# Patient Record
Sex: FEMALE | Race: BLACK OR AFRICAN AMERICAN | Employment: FULL TIME | URBAN - METROPOLITAN AREA
[De-identification: names, ages, dates, MRNs, and addresses within clinical notes are randomized per-mention and may not be internally consistent; named-entity substitution may affect disease eponyms.]

---

## 2019-05-12 ENCOUNTER — HOSPITAL ENCOUNTER (OUTPATIENT)
Age: 59
Setting detail: OBSERVATION
Discharge: HOME | End: 2019-05-13
Attending: EMERGENCY MEDICINE | Admitting: INTERNAL MEDICINE
Payer: COMMERCIAL

## 2019-05-12 ENCOUNTER — APPOINTMENT (OUTPATIENT)
Dept: GENERAL RADIOLOGY | Age: 59
End: 2019-05-12
Payer: COMMERCIAL

## 2019-05-12 DIAGNOSIS — R07.9 CHEST PAIN, UNSPECIFIED TYPE: Primary | ICD-10-CM

## 2019-05-12 LAB
ANION GAP SERPL CALCULATED.3IONS-SCNC: 15 MMOL/L (ref 3–16)
BASOPHILS ABSOLUTE: 0.1 K/UL (ref 0–0.2)
BASOPHILS RELATIVE PERCENT: 1.1 %
BUN BLDV-MCNC: 18 MG/DL (ref 7–20)
CALCIUM SERPL-MCNC: 9.1 MG/DL (ref 8.3–10.6)
CHLORIDE BLD-SCNC: 105 MMOL/L (ref 99–110)
CO2: 22 MMOL/L (ref 21–32)
CREAT SERPL-MCNC: 0.6 MG/DL (ref 0.6–1.1)
D DIMER: 201 NG/ML DDU (ref 0–229)
EOSINOPHILS ABSOLUTE: 0.1 K/UL (ref 0–0.6)
EOSINOPHILS RELATIVE PERCENT: 1.6 %
GFR AFRICAN AMERICAN: >60
GFR NON-AFRICAN AMERICAN: >60
GLUCOSE BLD-MCNC: 175 MG/DL (ref 70–99)
HCT VFR BLD CALC: 38.3 % (ref 36–48)
HEMOGLOBIN: 12.6 G/DL (ref 12–16)
LYMPHOCYTES ABSOLUTE: 3.1 K/UL (ref 1–5.1)
LYMPHOCYTES RELATIVE PERCENT: 39.1 %
MCH RBC QN AUTO: 27.3 PG (ref 26–34)
MCHC RBC AUTO-ENTMCNC: 32.8 G/DL (ref 31–36)
MCV RBC AUTO: 83.4 FL (ref 80–100)
MONOCYTES ABSOLUTE: 0.4 K/UL (ref 0–1.3)
MONOCYTES RELATIVE PERCENT: 5 %
NEUTROPHILS ABSOLUTE: 4.2 K/UL (ref 1.7–7.7)
NEUTROPHILS RELATIVE PERCENT: 53.2 %
PDW BLD-RTO: 14.2 % (ref 12.4–15.4)
PLATELET # BLD: 240 K/UL (ref 135–450)
PMV BLD AUTO: 9.8 FL (ref 5–10.5)
POTASSIUM REFLEX MAGNESIUM: 3.8 MMOL/L (ref 3.5–5.1)
PRO-BNP: 27 PG/ML (ref 0–124)
RBC # BLD: 4.59 M/UL (ref 4–5.2)
SODIUM BLD-SCNC: 142 MMOL/L (ref 136–145)
TROPONIN: <0.01 NG/ML
WBC # BLD: 8 K/UL (ref 4–11)

## 2019-05-12 PROCEDURE — 2580000003 HC RX 258: Performed by: INTERNAL MEDICINE

## 2019-05-12 PROCEDURE — 80048 BASIC METABOLIC PNL TOTAL CA: CPT

## 2019-05-12 PROCEDURE — 6370000000 HC RX 637 (ALT 250 FOR IP): Performed by: EMERGENCY MEDICINE

## 2019-05-12 PROCEDURE — 71046 X-RAY EXAM CHEST 2 VIEWS: CPT

## 2019-05-12 PROCEDURE — G0378 HOSPITAL OBSERVATION PER HR: HCPCS

## 2019-05-12 PROCEDURE — 99285 EMERGENCY DEPT VISIT HI MDM: CPT

## 2019-05-12 PROCEDURE — 96372 THER/PROPH/DIAG INJ SC/IM: CPT

## 2019-05-12 PROCEDURE — 84484 ASSAY OF TROPONIN QUANT: CPT

## 2019-05-12 PROCEDURE — 6370000000 HC RX 637 (ALT 250 FOR IP): Performed by: INTERNAL MEDICINE

## 2019-05-12 PROCEDURE — 6360000002 HC RX W HCPCS: Performed by: INTERNAL MEDICINE

## 2019-05-12 PROCEDURE — 36415 COLL VENOUS BLD VENIPUNCTURE: CPT

## 2019-05-12 PROCEDURE — 85379 FIBRIN DEGRADATION QUANT: CPT

## 2019-05-12 PROCEDURE — 85025 COMPLETE CBC W/AUTO DIFF WBC: CPT

## 2019-05-12 PROCEDURE — 93005 ELECTROCARDIOGRAM TRACING: CPT | Performed by: EMERGENCY MEDICINE

## 2019-05-12 PROCEDURE — 83880 ASSAY OF NATRIURETIC PEPTIDE: CPT

## 2019-05-12 RX ORDER — SODIUM CHLORIDE 0.9 % (FLUSH) 0.9 %
10 SYRINGE (ML) INJECTION EVERY 12 HOURS SCHEDULED
Status: DISCONTINUED | OUTPATIENT
Start: 2019-05-12 | End: 2019-05-13 | Stop reason: HOSPADM

## 2019-05-12 RX ORDER — FAMOTIDINE 20 MG/1
40 TABLET, FILM COATED ORAL NIGHTLY
Status: DISCONTINUED | OUTPATIENT
Start: 2019-05-12 | End: 2019-05-12 | Stop reason: CLARIF

## 2019-05-12 RX ORDER — LOSARTAN POTASSIUM 25 MG/1
25 TABLET ORAL DAILY
Status: DISCONTINUED | OUTPATIENT
Start: 2019-05-12 | End: 2019-05-13 | Stop reason: HOSPADM

## 2019-05-12 RX ORDER — LOSARTAN POTASSIUM 25 MG/1
25 TABLET ORAL DAILY
COMMUNITY
End: 2019-08-18

## 2019-05-12 RX ORDER — NITROGLYCERIN 0.4 MG/1
0.4 TABLET SUBLINGUAL EVERY 5 MIN PRN
Status: DISCONTINUED | OUTPATIENT
Start: 2019-05-12 | End: 2019-05-13 | Stop reason: HOSPADM

## 2019-05-12 RX ORDER — AMLODIPINE BESYLATE 5 MG/1
5 TABLET ORAL DAILY
COMMUNITY
End: 2019-08-18

## 2019-05-12 RX ORDER — ASPIRIN 81 MG/1
81 TABLET, CHEWABLE ORAL DAILY
Status: DISCONTINUED | OUTPATIENT
Start: 2019-05-13 | End: 2019-05-13 | Stop reason: HOSPADM

## 2019-05-12 RX ORDER — SODIUM CHLORIDE 0.9 % (FLUSH) 0.9 %
10 SYRINGE (ML) INJECTION PRN
Status: DISCONTINUED | OUTPATIENT
Start: 2019-05-12 | End: 2019-05-13 | Stop reason: HOSPADM

## 2019-05-12 RX ORDER — VALSARTAN AND HYDROCHLOROTHIAZIDE 80; 12.5 MG/1; MG/1
1 TABLET, FILM COATED ORAL DAILY
Status: DISCONTINUED | OUTPATIENT
Start: 2019-05-12 | End: 2019-05-12 | Stop reason: CLARIF

## 2019-05-12 RX ORDER — ASPIRIN 81 MG/1
324 TABLET, CHEWABLE ORAL ONCE
Status: COMPLETED | OUTPATIENT
Start: 2019-05-12 | End: 2019-05-12

## 2019-05-12 RX ORDER — OMEPRAZOLE 10 MG/1
10 CAPSULE, DELAYED RELEASE ORAL DAILY
COMMUNITY
End: 2019-08-23 | Stop reason: CLARIF

## 2019-05-12 RX ORDER — TOPIRAMATE 25 MG/1
25 TABLET ORAL 2 TIMES DAILY
Status: DISCONTINUED | OUTPATIENT
Start: 2019-05-12 | End: 2019-05-13 | Stop reason: HOSPADM

## 2019-05-12 RX ORDER — AMLODIPINE BESYLATE 5 MG/1
5 TABLET ORAL DAILY
Status: DISCONTINUED | OUTPATIENT
Start: 2019-05-12 | End: 2019-05-13 | Stop reason: HOSPADM

## 2019-05-12 RX ORDER — ONDANSETRON 2 MG/ML
4 INJECTION INTRAMUSCULAR; INTRAVENOUS EVERY 6 HOURS PRN
Status: DISCONTINUED | OUTPATIENT
Start: 2019-05-12 | End: 2019-05-13 | Stop reason: HOSPADM

## 2019-05-12 RX ORDER — ATORVASTATIN CALCIUM 40 MG/1
40 TABLET, FILM COATED ORAL NIGHTLY
Status: DISCONTINUED | OUTPATIENT
Start: 2019-05-12 | End: 2019-05-13 | Stop reason: HOSPADM

## 2019-05-12 RX ADMIN — AMLODIPINE BESYLATE 5 MG: 5 TABLET ORAL at 18:11

## 2019-05-12 RX ADMIN — ASPIRIN 81 MG 324 MG: 81 TABLET ORAL at 12:23

## 2019-05-12 RX ADMIN — Medication 10 ML: at 20:16

## 2019-05-12 RX ADMIN — ENOXAPARIN SODIUM 40 MG: 40 INJECTION SUBCUTANEOUS at 16:42

## 2019-05-12 RX ADMIN — TOPIRAMATE 25 MG: 25 TABLET, FILM COATED ORAL at 20:15

## 2019-05-12 RX ADMIN — ATORVASTATIN CALCIUM 40 MG: 40 TABLET, FILM COATED ORAL at 20:15

## 2019-05-12 RX ADMIN — LOSARTAN POTASSIUM 25 MG: 25 TABLET ORAL at 18:11

## 2019-05-12 ASSESSMENT — PAIN DESCRIPTION - LOCATION
LOCATION: CHEST

## 2019-05-12 ASSESSMENT — PAIN SCALES - GENERAL
PAINLEVEL_OUTOF10: 8
PAINLEVEL_OUTOF10: 5
PAINLEVEL_OUTOF10: 5
PAINLEVEL_OUTOF10: 0

## 2019-05-12 ASSESSMENT — PAIN DESCRIPTION - FREQUENCY
FREQUENCY: CONTINUOUS
FREQUENCY: CONTINUOUS

## 2019-05-12 ASSESSMENT — PAIN DESCRIPTION - PROGRESSION
CLINICAL_PROGRESSION: NOT CHANGED
CLINICAL_PROGRESSION: NOT CHANGED

## 2019-05-12 ASSESSMENT — PAIN DESCRIPTION - PAIN TYPE
TYPE: ACUTE PAIN

## 2019-05-12 ASSESSMENT — PAIN DESCRIPTION - ORIENTATION
ORIENTATION: LEFT;UPPER

## 2019-05-12 ASSESSMENT — PAIN DESCRIPTION - DESCRIPTORS
DESCRIPTORS: TIGHTNESS
DESCRIPTORS: CRUSHING;PRESSURE
DESCRIPTORS: TIGHTNESS

## 2019-05-12 ASSESSMENT — PAIN - FUNCTIONAL ASSESSMENT
PAIN_FUNCTIONAL_ASSESSMENT: ACTIVITIES ARE NOT PREVENTED
PAIN_FUNCTIONAL_ASSESSMENT: ACTIVITIES ARE NOT PREVENTED

## 2019-05-12 ASSESSMENT — HEART SCORE: ECG: 1

## 2019-05-12 ASSESSMENT — PAIN DESCRIPTION - ONSET
ONSET: ON-GOING
ONSET: ON-GOING

## 2019-05-12 NOTE — H&P
Hospital Medicine History & Physical      PCP: Bev Lara DO    Date of Admission: 5/12/2019    Date of Service: Pt seen/examined on 5/12/2019 and Placed in Observation. Chief Complaint:  Chest pain and tightness, shortness of breath      History Of Present Illness:      Arron Sanz is a 63 y.o. female who presented with acute onset of shortness of breath and chest pain yesterday while she was grocery shopping. She reports that she had done her laundry prior to that. Chest pain improved when she sat down to rest, but did not completely go away, this morning the chest pain had persisted. Therefore, she presented to the ED. She reports the pain is moderate to severe located centrally and on left side of her chest, is stabbing in nature and gets worse with deep breathing. Is not associated with activity or rest.      Old medical chart reviewed, briefly , patient had a negative stress test in 2015 when she presented with similar symptoms. Past Medical History:          Diagnosis Date    Back pain 11/15/2012    Essential hypertension, benign 7/5/2011    GERD (gastroesophageal reflux disease) 4/22/2016    Hyperlipidemia 7/7/2011    Migraine     Osteoarthritis of right knee 7/7/2011    Pyriformis syndrome 12/31/2012       Past Surgical History:          Procedure Laterality Date    BACK SURGERY      ECTOPIC PREGNANCY SURGERY      ENDOMETRIAL ABLATION      HYSTERECTOMY      JOINT REPLACEMENT         Medications Prior to Admission:      Prior to Admission medications    Medication Sig Start Date End Date Taking?  Authorizing Provider   losartan (COZAAR) 25 MG tablet Take 25 mg by mouth daily   Yes Historical Provider, MD   omeprazole (PRILOSEC) 10 MG delayed release capsule Take 10 mg by mouth daily   Yes Historical Provider, MD   Cyanocobalamin 1000 MCG CAPS Take 1,000 mcg by mouth daily 4/23/16  Yes Sylvia Cushing, MD   oxyCODONE-acetaminophen (PERCOCET) 5-325 MG per tablet Take 1-2 without Rales/Wheezes/Rhonchi. Cardiovascular:  Regular rate and rhythm with normal S1/S2 without murmurs, rubs or gallops. Tenderness to palpation at left subcostal region  Abdomen: Soft, non-tender, non-distended with normal bowel sounds. Musculoskeletal:  No clubbing, cyanosis or edema bilaterally. Full range of motion without deformity. Skin: Skin color, texture, turgor normal.  No rashes or lesions. Neurologic:  Neurovascularly intact without any focal sensory/motor deficits. Cranial nerves: II-XII intact, grossly non-focal.  Psychiatric:  Alert and oriented, thought content appropriate, normal insight  Capillary Refill: Brisk,< 3 seconds   Peripheral Pulses: +2 palpable, equal bilaterally       Labs:     Recent Labs     05/12/19  1224   WBC 8.0   HGB 12.6   HCT 38.3        Recent Labs     05/12/19  1224      K 3.8      CO2 22   BUN 18   CREATININE 0.6   CALCIUM 9.1     No results for input(s): AST, ALT, BILIDIR, BILITOT, ALKPHOS in the last 72 hours. No results for input(s): INR in the last 72 hours. Recent Labs     05/12/19  1224 05/12/19  1418   TROPONINI <0.01 <0.01       Urinalysis:      Lab Results   Component Value Date    BLOODU neg 02/26/2014    SPECGRAV 1.030 02/26/2014    GLUCOSEU neg 02/26/2014       Radiology:     CXR: I have reviewed the CXR with the following interpretation:     No acute infiltrate      EKG:  EKG reviewed with the ED physician, T-wave inversions in lateral leads.   Not available for review in EMR currently    XR CHEST STANDARD (2 VW)   Final Result      No acute cardiopulmonary disease      NM Cardiac Stress Test Nuclear Imaging    (Results Pending)   NM Cardiac Stress Test Nuclear Imaging    (Results Pending)       ASSESSMENT:    Active Hospital Problems    Diagnosis    Chest pain [R07.9]    GERD (gastroesophageal reflux disease) [K21.9]    Hyperlipidemia [E78.5]    Essential hypertension, benign [I10]         PLAN:    Central and left-sided chest pain with point tenderness point towards a musculoskeletal cause. However, patient has history of hypertension, hyperlipidemia, as well as strong family history. We will obtain exercise stress test to rule out cardiac cause. D-dimer is negative. Continue to trend troponins. If stress test negative, will consider GI versus musculoskeletal causes for chest pain      DVT Prophylaxis: Lovenox  Diet: DIET GENERAL; No Caffeine  Diet NPO, After Midnight  Diet NPO, After Midnight  Diet NPO, After Midnight  Code Status: Full Code    PT/OT Eval Status: Patient at baseline    Dispo - observation      Katherine Munson MD  5/12/2019 4:25 PM    Thank you Mandy Fernandez DO for the opportunity to be involved in this patient's care. If you have any questions or concerns please feel free to contact me at [phone].

## 2019-05-12 NOTE — PROGRESS NOTES
4 Eyes Admission Assessment     I agree as the admission nurse that 2 RN's have performed a thorough Head to Toe Skin Assessment on the patient. ALL assessment sites listed below have been assessed on admission. Areas assessed by both nurses:   [x]   Head, Face, and Ears   [x]   Shoulders, Back, and Chest  [x]   Arms, Elbows, and Hands   [x]   Coccyx, Sacrum, and Ischum  [x]   Legs, Feet, and Heels        Does the Patient have Skin Breakdown?   No         Jon Prevention initiated:  No   Wound Care Orders initiated:  No      [facility] nurse consulted for Pressure Injury (Stage 3,4, Unstageable, DTI, NWPT, and Complex wounds):  No      Nurse 1 eSignature: Electronically signed by Tyson Batista RN on 5/12/19 at 3:44 PM    **SHARE this note so that the co-signing nurse is able to place an eSignature**    Nurse 2 eSignature: Electronically signed by Cecilio Corona RN on 5/12/19 at 4:59 PM

## 2019-05-12 NOTE — ED TRIAGE NOTES
Pt arrived to ED with chest pain, nausea, SOB with exertion, LUQ pain that started yesterday while doing laundry. Today pain woke her up out of her sleep around 0830. Took one 81 mg aspirin this morning.

## 2019-05-12 NOTE — ED PROVIDER NOTES
[address] [facility] RESIDENT NOTE       Date of evaluation: 5/12/2019    Chief Complaint     Chest Pain      History of Present Illness     Deborah Prasad is a 63 y.o. female with history of hypertension, hyperlipidemia, migraines who presents with left-sided chest pressure. Pain started yesterday while doing some laundry. Pain felt like pressure in the left breast and under the left breast.  She had some tingling sensation in the left arm. No pain in the face or back. Had some mild shortness of breath with the pain. She feels like the pain is worse with movement. The pain was not pleuritic. She did have some nausea but no vomiting. Denies diaphoresis. Denies any swelling or pain in the lower extremities. Denies fever or productive cough or hemoptysis. Does have a family history of blood clots. Patient had a nuclear stress in April 2016 which revealed no evidence of ischemia and an EF of 55%. Review of Systems     Review of Systems   All other systems reviewed and are negative. Past Medical, Surgical, Family, and Social History     She has a past medical history of Back pain, Essential hypertension, benign, GERD (gastroesophageal reflux disease), Hyperlipidemia, Migraine, Osteoarthritis of right knee, and Pyriformis syndrome. She has a past surgical history that includes Hysterectomy; Endometrial ablation; joint replacement; Ectopic pregnancy surgery; and back surgery. Her family history includes Cancer in her maternal aunt, maternal grandmother, and paternal grandfather; [place] in her father; Diabetes in her mother; Heart Disease in her mother; Obesity in her mother. She reports that she quit smoking about 19 years ago. She quit after 13.00 years of use. She has never used smokeless tobacco. She reports that she drinks alcohol. She reports that she does not use drugs.     Medications     Previous Medications    CYANOCOBALAMIN 1000 MCG CAPS    Take 1,000 mcg by mouth daily    FAMOTIDINE (PEPCID) 40 MG TABLET    Take 1 tablet by mouth nightly    FERROUS SULFATE (FE TABS) 325 (65 FE) MG EC TABLET    Take 1 tablet by mouth daily (with breakfast)    METRONIDAZOLE (METROGEL) 0.75 % GEL    insert one applicatofull vaginally hs x5    OXYCODONE-ACETAMINOPHEN (PERCOCET) 5-325 MG PER TABLET    Take 1-2 tablets by mouth every 6 hours as needed for Pain  WARNING:  May cause drowsiness. May impair ability to operate vehicles or machinery. Do not use in combination with alcohol. Tamiko Andry PANTOPRAZOLE (PROTONIX) 40 MG TABLET    Take 1 tablet by mouth 2 times daily (before meals)    TOPIRAMATE (TOPAMAX) 25 MG TABLET    Take 25 mg by mouth 2 times daily    VALSARTAN-HYDROCHLOROTHIAZIDE (DIOVAN-HCT) 80-12.5 MG PER TABLET    TAKE 1 TABLET BY MOUTH DAILY       Allergies     She is allergic to penicillins and tramadol. Physical Exam     INITIAL VITALS: BP: (!) 139/97, Temp: 98.7 °F (37.1 °C), Pulse: 105, Resp: 18, SpO2: 96 %   Physical Exam   Constitutional: She is oriented to person, place, and time. She appears well-developed and well-nourished. No distress. HENT:   Head: Normocephalic and atraumatic. Eyes: Pupils are equal, round, and reactive to light. Neck: Normal range of motion. Cardiovascular: Normal rate, regular rhythm, normal heart sounds and intact distal pulses. No murmur heard. Pulmonary/Chest: Effort normal and breath sounds normal. No respiratory distress. She has no wheezes. She exhibits no tenderness. Abdominal: Soft. She exhibits no distension. There is no tenderness. Musculoskeletal: Normal range of motion. She exhibits no edema or tenderness. Neurological: She is alert and oriented to person, place, and time. No cranial nerve deficit or sensory deficit. Coordination normal.   Skin: Skin is warm and dry. Capillary refill takes 2 to 3 seconds. Psychiatric: She has a normal mood and affect.        DiagnosticResults     EKG Interpreted in conjunction with emergencydepartment physician No att. providers found  Rhythm: sinus tachycardia  Rate: 100-110  Axis: normal  Ectopy: none  Conduction: normal  ST Segments: no acute change  T Waves:inversion in  lateral leads  Q Waves: none  Clinical Impression: T-wave inversions in the lateral leads are new when compared to prior  Comparison:  April 2016    RADIOLOGY:  XR CHEST STANDARD (2 VW)   Final Result      No acute cardiopulmonary disease          LABS:   Results for orders placed or performed during the hospital encounter of 05/12/19   CBC Auto Differential   Result Value Ref Range    WBC 8.0 4.0 - 11.0 K/uL    RBC 4.59 4.00 - 5.20 M/uL    Hemoglobin 12.6 12.0 - 16.0 g/dL    Hematocrit 38.3 36.0 - 48.0 %    MCV 83.4 80.0 - 100.0 fL    MCH 27.3 26.0 - 34.0 pg    MCHC 32.8 31.0 - 36.0 g/dL    RDW 14.2 12.4 - 15.4 %    Platelets 685 397 - 171 K/uL    MPV 9.8 5.0 - 10.5 fL    Neutrophils % 53.2 %    Lymphocytes % 39.1 %    Monocytes % 5.0 %    Eosinophils % 1.6 %    Basophils % 1.1 %    Neutrophils # 4.2 1.7 - 7.7 K/uL    Lymphocytes # 3.1 1.0 - 5.1 K/uL    Monocytes # 0.4 0.0 - 1.3 K/uL    Eosinophils # 0.1 0.0 - 0.6 K/uL    Basophils # 0.1 0.0 - 0.2 K/uL   Basic Metabolic Panel w/ Reflex to MG   Result Value Ref Range    Sodium 142 136 - 145 mmol/L    Potassium reflex Magnesium 3.8 3.5 - 5.1 mmol/L    Chloride 105 99 - 110 mmol/L    CO2 22 21 - 32 mmol/L    Anion Gap 15 3 - 16    Glucose 175 (H) 70 - 99 mg/dL    BUN 18 7 - 20 mg/dL    CREATININE 0.6 0.6 - 1.1 mg/dL    GFR Non-African American >60 >60    GFR African American >60 >60    Calcium 9.1 8.3 - 10.6 mg/dL   Brain Natriuretic Peptide   Result Value Ref Range    Pro-BNP 27 0 - 124 pg/mL   Troponin   Result Value Ref Range    Troponin <0.01 <0.01 ng/mL   D-dimer, quantitative (Lab)   Result Value Ref Range    D-Dimer, Quant 201 0 - 229 ng/mL DDU   Troponin   Result Value Ref Range    Troponin <0.01 <0.01 ng/mL       ED BEDSIDE ULTRASOUND:    RECENT VITALS:  BP: (!) 124/102, Temp: 98.7 °F (37.1 °C), Pulse: 84,Resp: 15, SpO2: 96 %     Procedures       ED Course     Nursing Notes, Past Medical Hx, Past Surgical Hx, Social Hx, Allergies, and Family Hx were reviewed. The patient was given the followingmedications:  Orders Placed This Encounter   Medications    aspirin chewable tablet 324 mg       CONSULTS:  Tamy Rincon / UMBERTO / Christiano Redd is a 63 y.o. female presenting with left-sided chest pain with some paresthesias in the left arm. She had some shortness of breath and nausea with this as well. She is hemodynamically stable with some mild tachycardia. Her lungs are clear. EKG with some new T-wave inversions in the lateral leads but no ST elevation. Troponin was negative. X-ray without evidence of pneumonia or pulmonary edema or cardiomegaly. We did obtain a d-dimer due to her tachycardia and age and this was negative. Her heart rate normalized without intervention. Do not think she needs CT PA for PE workup. Low suspicion for aortic dissection. Heart score is 5. Patient will benefit from repeat stress imaging. We'll admit her for trending of troponins and stress in the morning. Patient agreeable to plan. This patient was also evaluated by the attending physician. All care plans werediscussed and agreed upon. Clinical Impression     1.  Chest pain, unspecified type        Disposition     PATIENT REFERRED TO:  Demetrice Domínguez, [address] [facility]  [address] [place] [address]  [phone]            DISCHARGE MEDICATIONS:  New Prescriptions    No medications on file       DISPOSITION Admitted 05/12/2019 02:37:35 PM        Francis Almaguer MD  Resident  05/12/19 3224

## 2019-05-12 NOTE — ED PROVIDER NOTES
ED Attending Attestation Note     Date of evaluation: 5/12/2019    This patient was seen by the resident. I have seen and examined the patient, agree with the workup, evaluation, management and diagnosis. The care plan has been discussed. I have reviewed the ECG and concur with the resident's interpretation. My assessment reveals a 52-year-old female who presents with chief complaint of chest pain. Patient with exertional chest pain and shortness of breath for 2 days. Normal heart and lungs on exam, no pain at this time.        Alfredo Lin MD  05/12/19 2012

## 2019-05-12 NOTE — PROGRESS NOTES
Pt is admitted to unit from ED. VSS. Bed in lowest position, call light and belongings within reach. Patient education folder given and reviewed. Safety protocols and unit activities (VS, meds, rounding, etc.) explained to patient/family. White board updated. No further questions or needs stated at this time. Instructed to call with any needs.   Electronically signed by Richie Couch RN on 5/12/2019 at 3:44 PM

## 2019-05-13 VITALS
DIASTOLIC BLOOD PRESSURE: 87 MMHG | BODY MASS INDEX: 36.93 KG/M2 | HEIGHT: 64 IN | TEMPERATURE: 97.9 F | WEIGHT: 216.3 LBS | SYSTOLIC BLOOD PRESSURE: 155 MMHG | RESPIRATION RATE: 18 BRPM | OXYGEN SATURATION: 99 % | HEART RATE: 69 BPM

## 2019-05-13 LAB
EKG ATRIAL RATE: 104 BPM
EKG ATRIAL RATE: 76 BPM
EKG DIAGNOSIS: NORMAL
EKG DIAGNOSIS: NORMAL
EKG P AXIS: 38 DEGREES
EKG P AXIS: 56 DEGREES
EKG P-R INTERVAL: 172 MS
EKG P-R INTERVAL: 176 MS
EKG Q-T INTERVAL: 348 MS
EKG Q-T INTERVAL: 412 MS
EKG QRS DURATION: 90 MS
EKG QRS DURATION: 90 MS
EKG QTC CALCULATION (BAZETT): 455 MS
EKG QTC CALCULATION (BAZETT): 463 MS
EKG R AXIS: -6 DEGREES
EKG R AXIS: 1 DEGREES
EKG T AXIS: -79 DEGREES
EKG T AXIS: 208 DEGREES
EKG VENTRICULAR RATE: 103 BPM
EKG VENTRICULAR RATE: 76 BPM
HCT VFR BLD CALC: 37.9 % (ref 36–48)
HEMOGLOBIN: 12.1 G/DL (ref 12–16)
LV EF: 56 %
LVEF MODALITY: NORMAL
MCH RBC QN AUTO: 27.2 PG (ref 26–34)
MCHC RBC AUTO-ENTMCNC: 32 G/DL (ref 31–36)
MCV RBC AUTO: 84.9 FL (ref 80–100)
PDW BLD-RTO: 14.1 % (ref 12.4–15.4)
PLATELET # BLD: 209 K/UL (ref 135–450)
PMV BLD AUTO: 9.4 FL (ref 5–10.5)
RBC # BLD: 4.47 M/UL (ref 4–5.2)
WBC # BLD: 7.7 K/UL (ref 4–11)

## 2019-05-13 PROCEDURE — 3430000000 HC RX DIAGNOSTIC RADIOPHARMACEUTICAL: Performed by: INTERNAL MEDICINE

## 2019-05-13 PROCEDURE — 36415 COLL VENOUS BLD VENIPUNCTURE: CPT

## 2019-05-13 PROCEDURE — 6370000000 HC RX 637 (ALT 250 FOR IP): Performed by: INTERNAL MEDICINE

## 2019-05-13 PROCEDURE — 93017 CV STRESS TEST TRACING ONLY: CPT

## 2019-05-13 PROCEDURE — 85027 COMPLETE CBC AUTOMATED: CPT

## 2019-05-13 PROCEDURE — 93005 ELECTROCARDIOGRAM TRACING: CPT | Performed by: INTERNAL MEDICINE

## 2019-05-13 PROCEDURE — A9502 TC99M TETROFOSMIN: HCPCS | Performed by: INTERNAL MEDICINE

## 2019-05-13 PROCEDURE — 6360000002 HC RX W HCPCS: Performed by: INTERNAL MEDICINE

## 2019-05-13 PROCEDURE — 93010 ELECTROCARDIOGRAM REPORT: CPT | Performed by: INTERNAL MEDICINE

## 2019-05-13 PROCEDURE — G0378 HOSPITAL OBSERVATION PER HR: HCPCS

## 2019-05-13 PROCEDURE — 93306 TTE W/DOPPLER COMPLETE: CPT

## 2019-05-13 PROCEDURE — 96372 THER/PROPH/DIAG INJ SC/IM: CPT

## 2019-05-13 PROCEDURE — 96374 THER/PROPH/DIAG INJ IV PUSH: CPT

## 2019-05-13 PROCEDURE — 6370000000 HC RX 637 (ALT 250 FOR IP): Performed by: FAMILY MEDICINE

## 2019-05-13 PROCEDURE — 2580000003 HC RX 258: Performed by: INTERNAL MEDICINE

## 2019-05-13 PROCEDURE — 78452 HT MUSCLE IMAGE SPECT MULT: CPT

## 2019-05-13 RX ORDER — ASPIRIN 81 MG/1
81 TABLET, CHEWABLE ORAL DAILY
Qty: 30 TABLET | Refills: 3 | Status: SHIPPED | OUTPATIENT
Start: 2019-05-14 | End: 2022-09-06

## 2019-05-13 RX ORDER — ACETAMINOPHEN 325 MG/1
650 TABLET ORAL EVERY 4 HOURS PRN
Status: DISCONTINUED | OUTPATIENT
Start: 2019-05-13 | End: 2019-05-13 | Stop reason: HOSPADM

## 2019-05-13 RX ORDER — KETOROLAC TROMETHAMINE 15 MG/ML
15 INJECTION, SOLUTION INTRAMUSCULAR; INTRAVENOUS EVERY 6 HOURS PRN
Status: DISCONTINUED | OUTPATIENT
Start: 2019-05-13 | End: 2019-05-13 | Stop reason: HOSPADM

## 2019-05-13 RX ADMIN — ACETAMINOPHEN 650 MG: 325 TABLET ORAL at 07:04

## 2019-05-13 RX ADMIN — REGADENOSON 0.4 MG: 0.08 INJECTION, SOLUTION INTRAVENOUS at 14:29

## 2019-05-13 RX ADMIN — TETROFOSMIN 30 MILLICURIE: 1.38 INJECTION, POWDER, LYOPHILIZED, FOR SOLUTION INTRAVENOUS at 14:29

## 2019-05-13 RX ADMIN — AMLODIPINE BESYLATE 5 MG: 5 TABLET ORAL at 09:21

## 2019-05-13 RX ADMIN — ENOXAPARIN SODIUM 40 MG: 40 INJECTION SUBCUTANEOUS at 09:20

## 2019-05-13 RX ADMIN — ONDANSETRON 4 MG: 2 INJECTION INTRAMUSCULAR; INTRAVENOUS at 07:06

## 2019-05-13 RX ADMIN — TOPIRAMATE 25 MG: 25 TABLET, FILM COATED ORAL at 09:20

## 2019-05-13 RX ADMIN — LOSARTAN POTASSIUM 25 MG: 25 TABLET ORAL at 16:56

## 2019-05-13 RX ADMIN — ASPIRIN 81 MG 81 MG: 81 TABLET ORAL at 09:20

## 2019-05-13 RX ADMIN — KETOROLAC TROMETHAMINE 15 MG: 15 INJECTION INTRAMUSCULAR; INTRAVENOUS at 17:04

## 2019-05-13 RX ADMIN — TETROFOSMIN 10 MILLICURIE: 1.38 INJECTION, POWDER, LYOPHILIZED, FOR SOLUTION INTRAVENOUS at 12:48

## 2019-05-13 RX ADMIN — Medication 10 ML: at 12:48

## 2019-05-13 RX ADMIN — Medication 10 ML: at 14:29

## 2019-05-13 RX ADMIN — Medication 10 ML: at 09:07

## 2019-05-13 ASSESSMENT — PAIN DESCRIPTION - LOCATION
LOCATION: HEAD
LOCATION: HEAD

## 2019-05-13 ASSESSMENT — PAIN DESCRIPTION - FREQUENCY
FREQUENCY: CONTINUOUS
FREQUENCY: CONTINUOUS

## 2019-05-13 ASSESSMENT — PAIN SCALES - GENERAL
PAINLEVEL_OUTOF10: 4
PAINLEVEL_OUTOF10: 0
PAINLEVEL_OUTOF10: 8
PAINLEVEL_OUTOF10: 6
PAINLEVEL_OUTOF10: 0
PAINLEVEL_OUTOF10: 0
PAINLEVEL_OUTOF10: 1
PAINLEVEL_OUTOF10: 0
PAINLEVEL_OUTOF10: 8
PAINLEVEL_OUTOF10: 0

## 2019-05-13 ASSESSMENT — PAIN DESCRIPTION - PROGRESSION
CLINICAL_PROGRESSION: GRADUALLY WORSENING
CLINICAL_PROGRESSION: NOT CHANGED
CLINICAL_PROGRESSION: RAPIDLY IMPROVING
CLINICAL_PROGRESSION: RAPIDLY IMPROVING

## 2019-05-13 ASSESSMENT — PAIN DESCRIPTION - DESCRIPTORS
DESCRIPTORS: HEADACHE
DESCRIPTORS: HEADACHE

## 2019-05-13 ASSESSMENT — PAIN DESCRIPTION - ONSET
ONSET: ON-GOING
ONSET: ON-GOING

## 2019-05-13 ASSESSMENT — PAIN DESCRIPTION - PAIN TYPE
TYPE: ACUTE PAIN
TYPE: CHRONIC PAIN

## 2019-05-13 ASSESSMENT — PAIN DESCRIPTION - ORIENTATION: ORIENTATION: MID

## 2019-05-13 NOTE — PROGRESS NOTES
ok'd by pharmacist to d/c home after taking torodol iv, pt without any sedating effects h/a is improving

## 2019-05-13 NOTE — PLAN OF CARE
Problem: OXYGENATION/RESPIRATORY FUNCTION  Goal: Patient will maintain patent airway  Outcome: Met This Shift  Goal: Patient will achieve/maintain normal respiratory rate/effort  Description  Respiratory rate and effort will be within normal limits for the patient  Outcome: Met This Shift  Note:   On room air sats WNL's lungs decreased at bases, denies sob     Problem: HEMODYNAMIC STATUS  Goal: Patient has stable vital signs and fluid balance  Outcome: Met This Shift  Note:   See I/O vs cont to monitor     Problem: FLUID AND ELECTROLYTE IMBALANCE  Goal: Fluid and electrolyte balance are achieved/maintained  Outcome: Met This Shift  Note:   Cont. To monitor CE neg.      Problem: Pain:  Goal: Pain level will decrease  Description  Pain level will decrease  Outcome: Met This Shift  Goal: Control of acute pain  Description  Control of acute pain  Outcome: Met This Shift  Note:   Chest pressure when taking a deep breath comes and goes at time not severe  Goal: Control of chronic pain  Description  Control of chronic pain  Outcome: Met This Shift

## 2019-05-13 NOTE — PROGRESS NOTES
D/c instructions reviewed with pt pt states she is already on 81mg asa not new to her; she is aware of meds to stop taking, per teach back method

## 2019-05-15 NOTE — DISCHARGE SUMMARY
Hospital Medicine Discharge Summary    Patient ID: Swati Bhatti      Patient's PCP: Slava Gutierrez DO    Admit Date: 5/12/2019     Discharge Date: 5/13/2019    Admitting Physician: Rufus Mann MD     Discharge Physician: Rufus Mann MD     Discharge Diagnoses: Active Problems:    Essential hypertension, benign    Hyperlipidemia    Chest pain    GERD (gastroesophageal reflux disease)  Resolved Problems:    * No resolved hospital problems. *        The patient was seen and examined on day of discharge and this discharge summary is in conjunction with any daily progress note from day of discharge. Hospital Course:     Swati Bhatti is a 63 y.o. female who was admitted for chest pain, initial EKG and troponin were negative. She underwent cardiac stress test which was unremarkable. She was discharged home in stable condition. Her symptoms were attributed to acid reflux/GERD          Exam:     BP (!) 155/87   Pulse 69   Temp 97.9 °F (36.6 °C) (Oral)   Resp 18   Ht 5' 4\" (1.626 m)   Wt 216 lb 4.8 oz (98.1 kg)   SpO2 99%   BMI 37.13 kg/m²       General appearance:  No apparent distress, appears stated age and cooperative. HEENT:  Normal cephalic, atraumatic without obvious deformity. Pupils equal, round, and reactive to light. Extra ocular muscles intact. Conjunctivae/corneas clear. Neck: Supple, with full range of motion. No jugular venous distention. Trachea midline. Respiratory:  Normal respiratory effort. Clear to auscultation, bilaterally without Rales/Wheezes/Rhonchi. Cardiovascular:  Regular rate and rhythm with normal S1/S2 without murmurs, rubs or gallops. Abdomen: Soft, non-tender, non-distended with normal bowel sounds. Musculoskeletal:  No clubbing, cyanosis or edema bilaterally. Full range of motion without deformity. Skin: Skin color, texture, turgor normal.  No rashes or lesions. Neurologic:  Neurovascularly intact without any focal sensory/motor deficits. Cranial nerves: II-XII intact, grossly non-focal.  Psychiatric:  Alert and oriented, thought content appropriate, normal insight  Capillary Refill: Brisk,< 3 seconds   Peripheral Pulses: +2 palpable, equal bilaterally       Labs: For convenience and continuity at follow-up the following most recent labs are provided:      CBC:    Lab Results   Component Value Date    WBC 7.7 05/13/2019    HGB 12.1 05/13/2019    HCT 37.9 05/13/2019     05/13/2019       Renal:    Lab Results   Component Value Date     05/12/2019    K 3.8 05/12/2019     05/12/2019    CO2 22 05/12/2019    BUN 18 05/12/2019    CREATININE 0.6 05/12/2019    CALCIUM 9.1 05/12/2019         Significant Diagnostic Studies    Radiology:   NM Cardiac Stress Test Nuclear Imaging   Final Result      XR CHEST STANDARD (2 VW)   Final Result      No acute cardiopulmonary disease             Consults:     IP CONSULT TO HOSPITALIST  IP CONSULT TO CASE MANAGEMENT    Disposition:  home     Condition at Discharge: Stable    Discharge Instructions/Follow-up:      Aris Son, [facility] [address]  [phone]    In 1 week  patient will make appointment      Code Status:  Prior     Activity: activity as tolerated    Diet: regular diet      Discharge Medications:     Discharge Medication List as of 5/13/2019  6:16 PM           Details   aspirin 81 MG chewable tablet Take 1 tablet by mouth daily, Disp-30 tablet, R-3Normal              Details   losartan (COZAAR) 25 MG tablet Take 25 mg by mouth dailyHistorical Med      omeprazole (PRILOSEC) 10 MG delayed release capsule Take 10 mg by mouth dailyHistorical Med      amLODIPine (NORVASC) 5 MG tablet Take 5 mg by mouth dailyHistorical Med      Cyanocobalamin 1000 MCG CAPS Take 1,000 mcg by mouth daily, Disp-30 capsule, R-3      oxyCODONE-acetaminophen (PERCOCET) 5-325 MG per tablet Take 1-2 tablets by mouth every 6 hours as needed for Pain  WARNING:  May cause drowsiness. May impair ability to operate vehicles or machinery. Do not use in combination with alcohol. ., Disp-10 tablet, R-0      topiramate (TOPAMAX) 25 MG tablet Take 25 mg by mouth 2 times daily             Time Spent on discharge is more than 20 minutes in the examination, evaluation, counseling and review of medications and discharge plan. Signed:    Veronica Ashley MD   5/15/2019      Thank you Melvin Powers DO for the opportunity to be involved in this patient's care. If you have any questions or concerns please feel free to contact me at [phone].

## 2019-06-10 ENCOUNTER — OFFICE VISIT (OUTPATIENT)
Dept: INTERNAL MEDICINE CLINIC | Age: 59
End: 2019-06-10
Payer: COMMERCIAL

## 2019-06-10 VITALS
HEART RATE: 68 BPM | BODY MASS INDEX: 37.56 KG/M2 | WEIGHT: 220 LBS | RESPIRATION RATE: 12 BRPM | SYSTOLIC BLOOD PRESSURE: 128 MMHG | HEIGHT: 64 IN | DIASTOLIC BLOOD PRESSURE: 80 MMHG

## 2019-06-10 DIAGNOSIS — Z12.39 SCREENING FOR BREAST CANCER: ICD-10-CM

## 2019-06-10 DIAGNOSIS — Z00.00 PERIODIC HEALTH ASSESSMENT, GENERAL SCREENING, ADULT: ICD-10-CM

## 2019-06-10 DIAGNOSIS — Z12.11 SPECIAL SCREENING FOR MALIGNANT NEOPLASMS, COLON: ICD-10-CM

## 2019-06-10 DIAGNOSIS — Z00.00 ROUTINE GENERAL MEDICAL EXAMINATION AT A HEALTH CARE FACILITY: Primary | ICD-10-CM

## 2019-06-10 LAB
BILIRUBIN, POC: NORMAL
BLOOD URINE, POC: NORMAL
CLARITY, POC: CLEAR
COLOR, POC: YELLOW
GLUCOSE URINE, POC: NORMAL
KETONES, POC: NORMAL
LEUKOCYTE EST, POC: NORMAL
NITRITE, POC: NORMAL
PH, POC: 5
PROTEIN, POC: NORMAL
SPECIFIC GRAVITY, POC: 1.02
UROBILINOGEN, POC: NORMAL

## 2019-06-10 PROCEDURE — 99386 PREV VISIT NEW AGE 40-64: CPT | Performed by: INTERNAL MEDICINE

## 2019-06-10 PROCEDURE — 81002 URINALYSIS NONAUTO W/O SCOPE: CPT | Performed by: INTERNAL MEDICINE

## 2019-06-10 RX ORDER — GABAPENTIN 100 MG/1
100 CAPSULE ORAL PRN
COMMUNITY
End: 2019-12-11 | Stop reason: SDUPTHER

## 2019-06-10 ASSESSMENT — PATIENT HEALTH QUESTIONNAIRE - PHQ9
SUM OF ALL RESPONSES TO PHQ QUESTIONS 1-9: 0
2. FEELING DOWN, DEPRESSED OR HOPELESS: 0
SUM OF ALL RESPONSES TO PHQ QUESTIONS 1-9: 0
1. LITTLE INTEREST OR PLEASURE IN DOING THINGS: 0
SUM OF ALL RESPONSES TO PHQ9 QUESTIONS 1 & 2: 0

## 2019-06-10 NOTE — PROGRESS NOTES
Deborah CORLEY [place] 63 y.o. presents today with   Chief Complaint   Patient presents with    Annual Exam       Family History   Problem Relation Age of Onset    Cancer Maternal Aunt     Cancer Maternal Grandmother     Cancer Paternal Grandfather     Other Father          - cause unknown     Diabetes Mother 80        Alive - DM, Hypertension, heart disease     Rheum Arthritis Neg Hx     Osteoarthritis Neg Hx     Asthma Neg Hx     Breast Cancer Neg Hx     Heart Failure Neg Hx     High Cholesterol Neg Hx     Hypertension Neg Hx     Migraines Neg Hx     Ovarian Cancer Neg Hx     Rashes/Skin Problems Neg Hx     Seizures Neg Hx     Stroke Neg Hx     Thyroid Disease Neg Hx        Social History     Socioeconomic History    Marital status:      Spouse name: Not on file    Number of children: 3    Years of education: Not on file    Highest education level: Not on file   Occupational History    Occupation: accounts receivable      Comment: [employer]    Social Needs    Financial resource strain: Not on file    Food insecurity:     Worry: Not on file     Inability: Not on file    Transportation needs:     Medical: Not on file     Non-medical: Not on file   Tobacco Use    Smoking status: Former Smoker     Packs/day: 0.50     Years: 13.00     Pack years: 6.50     Last attempt to quit: 2000     Years since quittin.4    Smokeless tobacco: Never Used   Substance and Sexual Activity    Alcohol use:  Yes     Alcohol/week: 0.0 oz     Comment: occas    Drug use: No    Sexual activity: Yes     Partners: Male   Lifestyle    Physical activity:     Days per week: Not on file     Minutes per session: Not on file    Stress: Not on file   Relationships    Social connections:     Talks on phone: Not on file     Gets together: Not on file     Attends Uats[employer] service: Not on file     Active member of club or organization: Not on file     Attends meetings of clubs or organizations: Not on file     Relationship status: Not on file    Intimate partner violence:     Fear of current or ex partner: Not on file     Emotionally abused: Not on file     Physically abused: Not on file     Forced sexual activity: Not on file   Other Topics Concern    Not on file   Social History Narrative    Living Will:  No       Patient Active Problem List   Diagnosis    Essential hypertension, benign    Hyperlipidemia       Past Medical History:   Diagnosis Date    Essential hypertension, benign 7/5/2011    GERD (gastroesophageal reflux disease) 4/22/2016    Hyperlipidemia 7/7/2011    Migraine     Osteoarthritis of right knee 7/7/2011    Pyriformis syndrome 12/31/2012        Past Surgical History:   Procedure Laterality Date    ECTOPIC PREGNANCY SURGERY      ENDOMETRIAL ABLATION      HYSTERECTOMY      JOINT REPLACEMENT      LUMBAR FUSION  2014    Dr. Lamberto Gerber        Current Outpatient Medications   Medication Sig Dispense Refill    gabapentin (NEURONTIN) 100 MG capsule Take 100 mg by mouth as needed.  aspirin 81 MG chewable tablet Take 1 tablet by mouth daily 30 tablet 3    omeprazole (PRILOSEC) 10 MG delayed release capsule Take 10 mg by mouth daily      oxyCODONE-acetaminophen (PERCOCET) 5-325 MG per tablet Take 1-2 tablets by mouth every 6 hours as needed for Pain  WARNING:  May cause drowsiness. May impair ability to operate vehicles or machinery. Do not use in combination with alcohol. . 10 tablet 0    Cyanocobalamin 1000 MCG CAPS Take 1,000 mcg by mouth daily 30 capsule 3    losartan (COZAAR) 25 MG tablet Take 25 mg by mouth daily      amLODIPine (NORVASC) 5 MG tablet Take 5 mg by mouth daily       No current facility-administered medications for this visit.         Allergies   Allergen Reactions    Penicillins Shortness Of Breath and Swelling    Tramadol Other (See Comments)     Chest pain       Review of Systems:     Immunization History   Administered Date(s) Administered    Influenza Virus Vaccine 10/01/2018    Tdap (Boostrix, Adacel) 09/01/2018     Eye Exam:  July, 2018 at [facility]  Chest: Denies: cough, hemoptysis, shortness of breath, pleuritic chest pain, wheezing  Cardiovascular: Denies: dyspnea on exertion, orthopnea, paroxysmal nocturnal dyspnea, edema, palpitations  Abdomen: no abdominal pain, change in bowel habits, or black or bloody stools  Colonoscopy:  May 13, 2016 by Dr. Dago Ellis was normal. To be repeated 2026   Fall Risk low  ADL without assistance   Mammography: October 5, 2017   DEXA: N/A  Pelvic and PAP: May, 2016 by Dr. Az Heredia  Other:  She has had chest tightness on and off for four years. It does not occur with exertion but only with rest.  It is associated with shortness of breath without nausea, vomiting, or diaphoresis. She was hospitalized at [facility] 10 days ago at which time a stress test and echocardiogram were normal.  She also has migraine that is treated with acupuncture. Physical Exam:  General appearance: alert, appears stated age and cooperative  /81 (Site: Left Upper Arm, Position: Sitting, Cuff Size: Medium Adult)   Pulse 68   Resp 12   Ht 5' 4\" (1.626 m)   Wt 220 lb (99.8 kg)   BMI 37.76 kg/m²   Eyes: conjunctivae/corneas clear. PERRL, EOM's intact. Fundi benign. Ears: normal TM's and external ear canals both ears  Nose: Nares normal. Septum midline. Mucosa normal. No drainage or sinus tenderness.   Throat: no abnormalities  Neck: no adenopathy, no carotid bruit, no JVD, supple, symmetrical, trachea midline and thyroid not enlarged, symmetric, no tenderness/mass/nodules  Nodes:no adenopathy palpable  Breasts:Breasts symmetrical, skin without lesion(s), no nipple retraction or dimpling, no nipple discharge, no masses palpated, no axillary or supraclavicular adenopathy  Lungs: clear to auscultation bilaterally  Heart: regular rate and rhythm, S1, S2 normal, no murmur, click, rub or gallop  Abdomen: soft, non-tender; bowel sounds normal; no masses,  no

## 2019-06-11 ENCOUNTER — HOSPITAL ENCOUNTER (OUTPATIENT)
Dept: MAMMOGRAPHY | Age: 59
Discharge: HOME | End: 2019-06-11
Payer: COMMERCIAL

## 2019-06-11 DIAGNOSIS — Z12.39 SCREENING FOR BREAST CANCER: ICD-10-CM

## 2019-06-11 PROCEDURE — 77063 BREAST TOMOSYNTHESIS BI: CPT

## 2019-06-12 DIAGNOSIS — Z00.00 PERIODIC HEALTH ASSESSMENT, GENERAL SCREENING, ADULT: ICD-10-CM

## 2019-06-12 DIAGNOSIS — Z00.00 ROUTINE GENERAL MEDICAL EXAMINATION AT A HEALTH CARE FACILITY: ICD-10-CM

## 2019-06-12 LAB
A/G RATIO: 1.3 (ref 1.1–2.2)
ALBUMIN SERPL-MCNC: 4.1 G/DL (ref 3.4–5)
ALP BLD-CCNC: 47 U/L (ref 40–129)
ALT SERPL-CCNC: 21 U/L (ref 10–40)
ANION GAP SERPL CALCULATED.3IONS-SCNC: 14 MMOL/L (ref 3–16)
AST SERPL-CCNC: 17 U/L (ref 15–37)
BILIRUB SERPL-MCNC: <0.2 MG/DL (ref 0–1)
BUN BLDV-MCNC: 22 MG/DL (ref 7–20)
CALCIUM SERPL-MCNC: 9.4 MG/DL (ref 8.3–10.6)
CHLORIDE BLD-SCNC: 104 MMOL/L (ref 99–110)
CHOLESTEROL, TOTAL: 209 MG/DL (ref 0–199)
CO2: 21 MMOL/L (ref 21–32)
CREAT SERPL-MCNC: 0.7 MG/DL (ref 0.6–1.1)
ESTIMATED AVERAGE GLUCOSE: 128.4 MG/DL
GFR AFRICAN AMERICAN: >60
GFR NON-AFRICAN AMERICAN: >60
GLOBULIN: 3.1 G/DL
GLUCOSE BLD-MCNC: 130 MG/DL (ref 70–99)
HBA1C MFR BLD: 6.1 %
HDLC SERPL-MCNC: 44 MG/DL (ref 40–60)
HEPATITIS C ANTIBODY INTERPRETATION: NORMAL
LDL CHOLESTEROL CALCULATED: 138 MG/DL
POTASSIUM SERPL-SCNC: 4.2 MMOL/L (ref 3.5–5.1)
SODIUM BLD-SCNC: 139 MMOL/L (ref 136–145)
TOTAL PROTEIN: 7.2 G/DL (ref 6.4–8.2)
TRIGL SERPL-MCNC: 137 MG/DL (ref 0–150)
TSH SERPL DL<=0.05 MIU/L-ACNC: 1.79 UIU/ML (ref 0.27–4.2)
VLDLC SERPL CALC-MCNC: 27 MG/DL

## 2019-06-13 LAB
HIV AG/AB: NORMAL
HIV ANTIGEN: NORMAL
HIV-1 ANTIBODY: NORMAL
HIV-2 AB: NORMAL

## 2019-06-27 DIAGNOSIS — Z12.11 SPECIAL SCREENING FOR MALIGNANT NEOPLASMS, COLON: ICD-10-CM

## 2019-06-27 LAB
CONTROL: NORMAL
HEMOCCULT STL QL: NEGATIVE

## 2019-06-27 PROCEDURE — 82274 ASSAY TEST FOR BLOOD FECAL: CPT | Performed by: INTERNAL MEDICINE

## 2019-07-01 ENCOUNTER — TELEPHONE (OUTPATIENT)
Dept: INTERNAL MEDICINE CLINIC | Age: 59
End: 2019-07-01

## 2019-07-16 ENCOUNTER — TELEPHONE (OUTPATIENT)
Dept: INTERNAL MEDICINE CLINIC | Age: 59
End: 2019-07-16

## 2019-08-18 ENCOUNTER — HOSPITAL ENCOUNTER (EMERGENCY)
Age: 59
Discharge: HOME | End: 2019-08-18
Attending: EMERGENCY MEDICINE
Payer: COMMERCIAL

## 2019-08-18 VITALS
HEART RATE: 91 BPM | HEIGHT: 64 IN | WEIGHT: 210 LBS | TEMPERATURE: 98.4 F | OXYGEN SATURATION: 98 % | SYSTOLIC BLOOD PRESSURE: 158 MMHG | RESPIRATION RATE: 16 BRPM | BODY MASS INDEX: 35.85 KG/M2 | DIASTOLIC BLOOD PRESSURE: 95 MMHG

## 2019-08-18 DIAGNOSIS — N39.0 URINARY TRACT INFECTION WITHOUT HEMATURIA, SITE UNSPECIFIED: Primary | ICD-10-CM

## 2019-08-18 LAB
A/G RATIO: 1.1 (ref 1.1–2.2)
ALBUMIN SERPL-MCNC: 3.8 G/DL (ref 3.4–5)
ALP BLD-CCNC: 45 U/L (ref 40–129)
ALT SERPL-CCNC: 16 U/L (ref 10–40)
AMYLASE: 54 U/L (ref 25–115)
ANION GAP SERPL CALCULATED.3IONS-SCNC: 15 MMOL/L (ref 3–16)
AST SERPL-CCNC: 14 U/L (ref 15–37)
BACTERIA: ABNORMAL /HPF
BASOPHILS ABSOLUTE: 0 K/UL (ref 0–0.2)
BASOPHILS RELATIVE PERCENT: 0.5 %
BILIRUB SERPL-MCNC: <0.2 MG/DL (ref 0–1)
BILIRUBIN URINE: NEGATIVE
BLOOD, URINE: NEGATIVE
BUN BLDV-MCNC: 18 MG/DL (ref 7–20)
CALCIUM SERPL-MCNC: 8.9 MG/DL (ref 8.3–10.6)
CHLORIDE BLD-SCNC: 105 MMOL/L (ref 99–110)
CLARITY: ABNORMAL
CO2: 21 MMOL/L (ref 21–32)
COLOR: YELLOW
CREAT SERPL-MCNC: 0.7 MG/DL (ref 0.6–1.1)
EOSINOPHILS ABSOLUTE: 0.1 K/UL (ref 0–0.6)
EOSINOPHILS RELATIVE PERCENT: 1.5 %
EPITHELIAL CELLS, UA: ABNORMAL /HPF
GFR AFRICAN AMERICAN: >60
GFR NON-AFRICAN AMERICAN: >60
GLOBULIN: 3.6 G/DL
GLUCOSE BLD-MCNC: 89 MG/DL (ref 70–99)
GLUCOSE URINE: NEGATIVE MG/DL
HCT VFR BLD CALC: 37.2 % (ref 36–48)
HEMOGLOBIN: 12.2 G/DL (ref 12–16)
KETONES, URINE: NEGATIVE MG/DL
LEUKOCYTE ESTERASE, URINE: ABNORMAL
LIPASE: 19 U/L (ref 13–60)
LYMPHOCYTES ABSOLUTE: 3.1 K/UL (ref 1–5.1)
LYMPHOCYTES RELATIVE PERCENT: 37.7 %
MCH RBC QN AUTO: 27.7 PG (ref 26–34)
MCHC RBC AUTO-ENTMCNC: 32.8 G/DL (ref 31–36)
MCV RBC AUTO: 84.6 FL (ref 80–100)
MICROSCOPIC EXAMINATION: YES
MONOCYTES ABSOLUTE: 0.5 K/UL (ref 0–1.3)
MONOCYTES RELATIVE PERCENT: 6 %
NEUTROPHILS ABSOLUTE: 4.5 K/UL (ref 1.7–7.7)
NEUTROPHILS RELATIVE PERCENT: 54.3 %
NITRITE, URINE: NEGATIVE
PDW BLD-RTO: 15 % (ref 12.4–15.4)
PH UA: 6.5 (ref 5–8)
PLATELET # BLD: 226 K/UL (ref 135–450)
PMV BLD AUTO: 9.3 FL (ref 5–10.5)
POTASSIUM SERPL-SCNC: 3.6 MMOL/L (ref 3.5–5.1)
PROTEIN UA: ABNORMAL MG/DL
RBC # BLD: 4.4 M/UL (ref 4–5.2)
RBC UA: ABNORMAL /HPF (ref 0–2)
SODIUM BLD-SCNC: 141 MMOL/L (ref 136–145)
SPECIFIC GRAVITY UA: 1.02 (ref 1–1.03)
TOTAL PROTEIN: 7.4 G/DL (ref 6.4–8.2)
URINE TYPE: ABNORMAL
UROBILINOGEN, URINE: 1 E.U./DL
WBC # BLD: 8.3 K/UL (ref 4–11)
WBC UA: ABNORMAL /HPF (ref 0–5)

## 2019-08-18 PROCEDURE — 6370000000 HC RX 637 (ALT 250 FOR IP): Performed by: PHYSICIAN ASSISTANT

## 2019-08-18 PROCEDURE — 99283 EMERGENCY DEPT VISIT LOW MDM: CPT

## 2019-08-18 PROCEDURE — 87186 SC STD MICRODIL/AGAR DIL: CPT

## 2019-08-18 PROCEDURE — 36415 COLL VENOUS BLD VENIPUNCTURE: CPT

## 2019-08-18 PROCEDURE — 87077 CULTURE AEROBIC IDENTIFY: CPT

## 2019-08-18 PROCEDURE — 83690 ASSAY OF LIPASE: CPT

## 2019-08-18 PROCEDURE — 80053 COMPREHEN METABOLIC PANEL: CPT

## 2019-08-18 PROCEDURE — 82150 ASSAY OF AMYLASE: CPT

## 2019-08-18 PROCEDURE — 85025 COMPLETE CBC W/AUTO DIFF WBC: CPT

## 2019-08-18 PROCEDURE — 81001 URINALYSIS AUTO W/SCOPE: CPT

## 2019-08-18 PROCEDURE — 87086 URINE CULTURE/COLONY COUNT: CPT

## 2019-08-18 RX ORDER — SULFAMETHOXAZOLE AND TRIMETHOPRIM 800; 160 MG/1; MG/1
1 TABLET ORAL 2 TIMES DAILY
Qty: 10 TABLET | Refills: 0 | Status: SHIPPED | OUTPATIENT
Start: 2019-08-18 | End: 2019-08-23

## 2019-08-18 RX ORDER — SULFAMETHOXAZOLE AND TRIMETHOPRIM 800; 160 MG/1; MG/1
1 TABLET ORAL ONCE
Status: COMPLETED | OUTPATIENT
Start: 2019-08-18 | End: 2019-08-18

## 2019-08-18 RX ADMIN — SULFAMETHOXAZOLE AND TRIMETHOPRIM 1 TABLET: 800; 160 TABLET ORAL at 20:26

## 2019-08-18 ASSESSMENT — PAIN DESCRIPTION - LOCATION: LOCATION: ABDOMEN

## 2019-08-18 ASSESSMENT — PAIN DESCRIPTION - DESCRIPTORS: DESCRIPTORS: ACHING

## 2019-08-18 ASSESSMENT — PAIN DESCRIPTION - ORIENTATION: ORIENTATION: RIGHT;LOWER

## 2019-08-18 ASSESSMENT — PAIN DESCRIPTION - PAIN TYPE: TYPE: CHRONIC PAIN

## 2019-08-18 ASSESSMENT — PAIN DESCRIPTION - FREQUENCY: FREQUENCY: CONTINUOUS

## 2019-08-18 ASSESSMENT — PAIN SCALES - GENERAL: PAINLEVEL_OUTOF10: 3

## 2019-08-18 NOTE — ED PROVIDER NOTES
[address] [facility] [address] ENCOUNTER          PHYSICIAN ASSISTANT NOTE       Date of evaluation: 8/18/2019    Chief Complaint     Abdominal Pain and Emesis      History of Present Illness     Deborah Hassan is a 63 y.o. female with a history of GERD, hypertension, hyperlipidemia presents today with chronic nausea and vague abdominal pain is been ongoing for about 6 months, but worse over the past several weeks. She states she is nauseous after every meal.  She has not vomited. She endorses associated aching and pain along her right sided abdomen that appears almost constant. It is not relieved by bowel movements. She denies any urinary symptoms. She feels that the nausea is worse over the past several days. Denies any fever or chills. No changes in her diet. Occasional diarrhea but no constipation. Endorses some mild weight gain but no weight loss. No family history of ovarian cancer. Review of Systems     As documented in the HPI, otherwise all other systems were reviewed and were negative. Past Medical, Surgical, Family, and Social History     She has a past medical history of Essential hypertension, benign, GERD (gastroesophageal reflux disease), Hyperlipidemia, Migraine, Osteoarthritis of right knee, Pyriformis syndrome, and Screening mammogram for high-risk patient. She has a past surgical history that includes Hysterectomy; Endometrial ablation; Ectopic pregnancy surgery; lumbar fusion (2014); Upper gastrointestinal endoscopy (*May, 2016); and Colonoscopy (*May 13, 2016 ( 2026 )). Her family history includes Cancer in her maternal aunt, maternal grandmother, and paternal grandfather; Diabetes (age of onset: 80) in her mother; Other in her father. She reports that she quit smoking about 19 years ago. She has a 6.50 pack-year smoking history. She has never used smokeless tobacco. She reports that she drinks alcohol. She reports that she does not use drugs.     Medications

## 2019-08-20 ENCOUNTER — TELEPHONE (OUTPATIENT)
Dept: INTERNAL MEDICINE CLINIC | Age: 59
End: 2019-08-20

## 2019-08-21 LAB
ORGANISM: ABNORMAL
URINE CULTURE, ROUTINE: ABNORMAL

## 2019-08-23 ENCOUNTER — OFFICE VISIT (OUTPATIENT)
Dept: INTERNAL MEDICINE CLINIC | Age: 59
End: 2019-08-23
Payer: COMMERCIAL

## 2019-08-23 VITALS
HEIGHT: 64 IN | RESPIRATION RATE: 12 BRPM | HEART RATE: 66 BPM | DIASTOLIC BLOOD PRESSURE: 80 MMHG | BODY MASS INDEX: 38.07 KG/M2 | SYSTOLIC BLOOD PRESSURE: 120 MMHG | WEIGHT: 223 LBS

## 2019-08-23 DIAGNOSIS — R13.10 DYSPHAGIA, UNSPECIFIED TYPE: ICD-10-CM

## 2019-08-23 DIAGNOSIS — E78.5 HYPERLIPIDEMIA, UNSPECIFIED HYPERLIPIDEMIA TYPE: ICD-10-CM

## 2019-08-23 DIAGNOSIS — R12 HEART BURN: Primary | ICD-10-CM

## 2019-08-23 DIAGNOSIS — N39.0 URINARY TRACT INFECTION WITHOUT HEMATURIA, SITE UNSPECIFIED: ICD-10-CM

## 2019-08-23 DIAGNOSIS — R11.0 NAUSEA: Primary | ICD-10-CM

## 2019-08-23 DIAGNOSIS — R12 HEART BURN: ICD-10-CM

## 2019-08-23 DIAGNOSIS — R73.09 ELEVATED GLUCOSE: ICD-10-CM

## 2019-08-23 PROCEDURE — 81002 URINALYSIS NONAUTO W/O SCOPE: CPT | Performed by: INTERNAL MEDICINE

## 2019-08-23 PROCEDURE — 99213 OFFICE O/P EST LOW 20 MIN: CPT | Performed by: INTERNAL MEDICINE

## 2019-08-23 RX ORDER — OMEPRAZOLE 20 MG/1
20 CAPSULE, DELAYED RELEASE ORAL 2 TIMES DAILY
Qty: 180 CAPSULE | Refills: 3 | Status: SHIPPED | OUTPATIENT
Start: 2019-08-23 | End: 2020-09-14

## 2019-08-23 RX ORDER — OMEPRAZOLE 20 MG/1
20 CAPSULE, DELAYED RELEASE ORAL 2 TIMES DAILY
COMMUNITY
End: 2019-08-23 | Stop reason: SDUPTHER

## 2019-08-23 NOTE — PROGRESS NOTES
gallop  Extremities: extremities normal, atraumatic, no cyanosis or edema  Other: N/A    Lab Review: not applicable      Assessment: Nausea    Plan:               UA and culture, fasting labs, and EGD  Increase omeprazole to 20 mg po bid     Jayshree Rivas MD

## 2019-08-25 LAB — URINE CULTURE, ROUTINE: NORMAL

## 2019-09-06 DIAGNOSIS — E78.5 HYPERLIPIDEMIA, UNSPECIFIED HYPERLIPIDEMIA TYPE: ICD-10-CM

## 2019-09-06 DIAGNOSIS — R73.09 ELEVATED GLUCOSE: ICD-10-CM

## 2019-09-06 LAB
ALT SERPL-CCNC: 18 U/L (ref 10–40)
AST SERPL-CCNC: 17 U/L (ref 15–37)
CHOLESTEROL, TOTAL: 206 MG/DL (ref 0–199)
GLUCOSE BLD-MCNC: 111 MG/DL (ref 70–99)
HDLC SERPL-MCNC: 42 MG/DL (ref 40–60)
LDL CHOLESTEROL CALCULATED: 134 MG/DL
TOTAL CK: 158 U/L (ref 26–192)
TRIGL SERPL-MCNC: 150 MG/DL (ref 0–150)
VLDLC SERPL CALC-MCNC: 30 MG/DL

## 2019-09-07 LAB
ESTIMATED AVERAGE GLUCOSE: 122.6 MG/DL
HBA1C MFR BLD: 5.9 %

## 2019-09-25 RX ORDER — NAPROXEN 500 MG/1
500 TABLET ORAL 2 TIMES DAILY WITH MEALS
Qty: 30 TABLET | Refills: 2 | Status: SHIPPED | OUTPATIENT
Start: 2019-09-25 | End: 2019-12-11 | Stop reason: SDUPTHER

## 2019-12-11 ENCOUNTER — HOSPITAL ENCOUNTER (OUTPATIENT)
Dept: GENERAL RADIOLOGY | Age: 59
Discharge: HOME | End: 2019-12-11
Payer: COMMERCIAL

## 2019-12-11 ENCOUNTER — HOSPITAL ENCOUNTER (OUTPATIENT)
Age: 59
Discharge: HOME | End: 2019-12-11
Payer: COMMERCIAL

## 2019-12-11 ENCOUNTER — OFFICE VISIT (OUTPATIENT)
Dept: INTERNAL MEDICINE CLINIC | Age: 59
End: 2019-12-11
Payer: COMMERCIAL

## 2019-12-11 VITALS
HEART RATE: 70 BPM | DIASTOLIC BLOOD PRESSURE: 76 MMHG | SYSTOLIC BLOOD PRESSURE: 128 MMHG | WEIGHT: 225 LBS | RESPIRATION RATE: 12 BRPM | BODY MASS INDEX: 38.41 KG/M2 | HEIGHT: 64 IN

## 2019-12-11 DIAGNOSIS — M79.605 LEFT LEG PAIN: ICD-10-CM

## 2019-12-11 DIAGNOSIS — I10 ESSENTIAL HYPERTENSION, BENIGN: ICD-10-CM

## 2019-12-11 DIAGNOSIS — M79.605 LEFT LEG PAIN: Primary | ICD-10-CM

## 2019-12-11 PROCEDURE — 72100 X-RAY EXAM L-S SPINE 2/3 VWS: CPT

## 2019-12-11 PROCEDURE — 73562 X-RAY EXAM OF KNEE 3: CPT

## 2019-12-11 PROCEDURE — 99213 OFFICE O/P EST LOW 20 MIN: CPT | Performed by: INTERNAL MEDICINE

## 2019-12-11 RX ORDER — CITALOPRAM 10 MG/1
10 TABLET ORAL DAILY
Qty: 90 TABLET | Refills: 3 | Status: SHIPPED | OUTPATIENT
Start: 2019-12-11 | End: 2020-08-18

## 2019-12-11 RX ORDER — NAPROXEN 500 MG/1
500 TABLET ORAL 2 TIMES DAILY PRN
Qty: 30 TABLET | Refills: 12 | Status: SHIPPED | OUTPATIENT
Start: 2019-12-11 | End: 2020-07-03

## 2019-12-11 RX ORDER — GABAPENTIN 100 MG/1
100 CAPSULE ORAL PRN
Qty: 90 CAPSULE | Refills: 0 | Status: SHIPPED | OUTPATIENT
Start: 2019-12-11 | End: 2020-03-18

## 2019-12-11 RX ORDER — CITALOPRAM 10 MG/1
10 TABLET ORAL DAILY
COMMUNITY
End: 2019-12-11 | Stop reason: SDUPTHER

## 2019-12-12 ENCOUNTER — TELEPHONE (OUTPATIENT)
Dept: INTERNAL MEDICINE CLINIC | Age: 59
End: 2019-12-12

## 2020-03-18 RX ORDER — GABAPENTIN 100 MG/1
CAPSULE ORAL
Qty: 90 CAPSULE | Refills: 0 | Status: SHIPPED | OUTPATIENT
Start: 2020-03-18 | End: 2020-08-18

## 2020-04-01 ENCOUNTER — TELEPHONE (OUTPATIENT)
Dept: INTERNAL MEDICINE CLINIC | Age: 60
End: 2020-04-01

## 2020-05-26 ENCOUNTER — HOSPITAL ENCOUNTER (OUTPATIENT)
Dept: MAMMOGRAPHY | Age: 60
Discharge: HOME | End: 2020-05-26
Payer: COMMERCIAL

## 2020-05-26 PROCEDURE — 77063 BREAST TOMOSYNTHESIS BI: CPT

## 2020-06-01 ENCOUNTER — HOSPITAL ENCOUNTER (OUTPATIENT)
Dept: ULTRASOUND IMAGING | Age: 60
Discharge: HOME | End: 2020-06-01
Payer: COMMERCIAL

## 2020-06-01 PROCEDURE — 76642 ULTRASOUND BREAST LIMITED: CPT

## 2020-07-03 RX ORDER — NAPROXEN 500 MG/1
TABLET ORAL
Qty: 30 TABLET | Refills: 12 | Status: SHIPPED | OUTPATIENT
Start: 2020-07-03 | End: 2021-07-28 | Stop reason: SDUPTHER

## 2020-07-20 ENCOUNTER — TELEPHONE (OUTPATIENT)
Dept: INTERNAL MEDICINE CLINIC | Age: 60
End: 2020-07-20

## 2020-07-20 NOTE — TELEPHONE ENCOUNTER
Migraine since last Wednesday  Usually treated with acupuncture - hasn't had one for 4 years. Acupuncturist is on sabbatical   She's drained  Called [facility] on Friday - given Zomig which doesn't help once the migraine is in effect. Gave symptoms like a heart attack so she stopped it.

## 2020-07-20 NOTE — TELEPHONE ENCOUNTER
Message given to patient.   Rx phoned in to Traci Masters in [address] [facility]

## 2020-07-21 ENCOUNTER — TELEPHONE (OUTPATIENT)
Dept: INTERNAL MEDICINE CLINIC | Age: 60
End: 2020-07-21

## 2020-07-21 NOTE — TELEPHONE ENCOUNTER
Picked up the fioricet  Not helping. Is there some kind of migraine medicine you can prescribe?   Can't take Imitrex or Zomig (they cause symptoms that mimic a heart attack with her)

## 2020-07-21 NOTE — TELEPHONE ENCOUNTER
Message given to the patient. She states she has tried the medication - she wanted something non narcotic  She doesn't want to see a neurologist - she doesn't want to take medication.   She is trying to get another acupuncturist.  She mentioned she was seen in the ER in the past.

## 2020-08-06 ENCOUNTER — TELEPHONE (OUTPATIENT)
Dept: INTERNAL MEDICINE CLINIC | Age: 60
End: 2020-08-06

## 2020-08-06 NOTE — TELEPHONE ENCOUNTER
Acupuncture isn't working for her migraines. You mentioned seeing a neurologist - who do you recommend?

## 2020-08-10 ENCOUNTER — TELEPHONE (OUTPATIENT)
Dept: INTERNAL MEDICINE CLINIC | Age: 60
End: 2020-08-10

## 2020-08-10 NOTE — TELEPHONE ENCOUNTER
Patient called - Dr. Kam Blanchard her previous neurologist will see her. Would like order faxed to him @ [phone] which was done.

## 2020-08-18 ENCOUNTER — OFFICE VISIT (OUTPATIENT)
Dept: INTERNAL MEDICINE CLINIC | Age: 60
End: 2020-08-18
Payer: COMMERCIAL

## 2020-08-18 VITALS
DIASTOLIC BLOOD PRESSURE: 76 MMHG | WEIGHT: 221 LBS | TEMPERATURE: 97.5 F | RESPIRATION RATE: 12 BRPM | HEIGHT: 64 IN | SYSTOLIC BLOOD PRESSURE: 124 MMHG | HEART RATE: 96 BPM | BODY MASS INDEX: 37.73 KG/M2

## 2020-08-18 PROCEDURE — 99396 PREV VISIT EST AGE 40-64: CPT | Performed by: INTERNAL MEDICINE

## 2020-08-18 PROCEDURE — 93000 ELECTROCARDIOGRAM COMPLETE: CPT | Performed by: INTERNAL MEDICINE

## 2020-08-18 ASSESSMENT — PATIENT HEALTH QUESTIONNAIRE - PHQ9
2. FEELING DOWN, DEPRESSED OR HOPELESS: 0
SUM OF ALL RESPONSES TO PHQ9 QUESTIONS 1 & 2: 0
SUM OF ALL RESPONSES TO PHQ QUESTIONS 1-9: 0
SUM OF ALL RESPONSES TO PHQ QUESTIONS 1-9: 0
1. LITTLE INTEREST OR PLEASURE IN DOING THINGS: 0

## 2020-08-18 NOTE — PROGRESS NOTES
Deborah CORLEY Washington 63 y.o. presents today with   Chief Complaint   Patient presents with    Annual Exam       Family History   Problem Relation Age of Onset    Cancer Maternal Aunt     Cancer Maternal Grandmother     Cancer Paternal Grandfather     Other Father          - cause unknown     Diabetes Mother 80        Alive - DM, Hypertension, heart disease     Rheum Arthritis Neg Hx     Osteoarthritis Neg Hx     Asthma Neg Hx     Breast Cancer Neg Hx     Heart Failure Neg Hx     High Cholesterol Neg Hx     Hypertension Neg Hx     Migraines Neg Hx     Ovarian Cancer Neg Hx     Rashes/Skin Problems Neg Hx     Seizures Neg Hx     Stroke Neg Hx     Thyroid Disease Neg Hx        Social History     Socioeconomic History    Marital status:      Spouse name: Not on file    Number of children: 3    Years of education: Not on file    Highest education level: Not on file   Occupational History    Occupation: accounts receivable      Comment: [employer]    Social Needs    Financial resource strain: Not on file    Food insecurity     Worry: Not on file     Inability: Not on file   [employer] needs     Medical: Not on file     Non-medical: Not on file   Tobacco Use    Smoking status: Former Smoker     Packs/day: 0.50     Years: 13.00     Pack years: 6.50     Last attempt to quit: 2000     Years since quittin.6    Smokeless tobacco: Never Used   Substance and Sexual Activity    Alcohol use:  Yes     Alcohol/week: 0.0 standard drinks     Comment: occas    Drug use: No    Sexual activity: Yes     Partners: Male   Lifestyle    Physical activity     Days per week: Not on file     Minutes per session: Not on file    Stress: Not on file   Relationships    Social connections     Talks on phone: Not on file     Gets together: Not on file     Attends Caodaism service: Not on file     Active member of club or organization: Not on file     Attends meetings of clubs or organizations: Not on file     Relationship status: Not on file    Intimate partner violence     Fear of current or ex partner: Not on file     Emotionally abused: Not on file     Physically abused: Not on file     Forced sexual activity: Not on file   Other Topics Concern    Not on file   Social History Narrative    Living Will:  No       Patient Active Problem List   Diagnosis    Essential hypertension, benign    Hyperlipidemia       Past Medical History:   Diagnosis Date    Essential hypertension, benign 7/5/2011    GERD (gastroesophageal reflux disease) 4/22/2016    Hyperlipidemia 7/7/2011    Migraine     Osteoarthritis of right knee 7/7/2011    Pyriformis syndrome 12/31/2012    Screening mammogram for high-risk patient *June 11, 2019    Benign    Screening mammogram, encounter for *May 26, 2020        Past Surgical History:   Procedure Laterality Date    COLONOSCOPY  *May 13, 2016 ( 2026 )    Dr. Cameron Stevens - diverticulosis, polyp ( colonoc mucosa )    ECTOPIC PREGNANCY SURGERY      ENDOMETRIAL ABLATION      HYSTERECTOMY      LUMBAR FUSION  2014    Dr. Angela Sy  *May, 2016    Dr. Cameron Stevens - erosive esophagitis    UPPER GASTROINTESTINAL ENDOSCOPY  *Sept.24, 2019    Dr. Cameron Stevens - gastric nodule ( biopsy benign )       Current Outpatient Medications   Medication Sig Dispense Refill    naproxen (NAPROSYN) 500 MG tablet TAKE 1 TABLET BY MOUTH TWICE DAILY ON A FULL STOMACH AS NEEDED FOR PAIN 30 tablet 12    omeprazole (PRILOSEC) 20 MG delayed release capsule Take 1 capsule by mouth 2 times daily 180 capsule 3    aspirin 81 MG chewable tablet Take 1 tablet by mouth daily 30 tablet 3    Cyanocobalamin 1000 MCG CAPS Take 1,000 mcg by mouth daily 30 capsule 3     No current facility-administered medications for this visit.         Allergies   Allergen Reactions    Penicillins Shortness Of Breath and Swelling    Tramadol Other (See Comments)     Chest pain       Review of Systems:     Immunization History   Administered Date(s) Administered    Influenza Virus Vaccine 10/01/2018, 10/13/2019    Tdap (Boostrix, Adacel) 09/01/2018     Eye Exam:  July, 2020 at [address] [facility]  Chest: Denies: cough, hemoptysis, shortness of breath, pleuritic chest pain, wheezing  Cardiovascular: Denies: chest pain, dyspnea on exertion, orthopnea, paroxysmal nocturnal dyspnea, edema, palpitations  Abdomen: no abdominal pain, change in bowel habits, or black or bloody stools  Colonoscopy:  May, 2016 by Dr. Jose Neville revealed diverticulosis and a polyp. To be repeated 2026  Fall Risk low  ADL   Without assistance  Mammography:May 26, 2020  DEXA: N/A  Pelvic and PAP: Overdue from Dr. Hannah Ivory  Other:  N/A      Physical Exam:  General appearance: alert, appears stated age and cooperative  /77 (Site: Left Upper Arm, Position: Sitting, Cuff Size: Medium Adult)   Pulse 96   Temp 97.5 °F (36.4 °C)   Resp 12   Ht 5' 4\" (1.626 m)   Wt 221 lb (100.2 kg)   BMI 37.93 kg/m²   Eyes: conjunctivae/corneas clear. PERRL, EOM's intact. Fundi benign. Ears: normal TM's and external ear canals both ears  Nose: Nares normal. Septum midline. Mucosa normal. No drainage or sinus tenderness. Throat: N/A  Neck: no adenopathy, no carotid bruit, no JVD, supple, symmetrical, trachea midline and thyroid not enlarged, symmetric, no tenderness/mass/nodules  Nodes:no adenopathy palpable  Breasts:Breasts symmetrical, skin without lesion(s), no nipple retraction or dimpling, no nipple discharge, no masses palpated, no axillary or supraclavicular adenopathy  Lungs: clear to auscultation bilaterally  Heart: regular rate and rhythm, S1, S2 normal, no murmur, click, rub or gallop  Abdomen: soft, non-tender; bowel sounds normal; no masses,  no organomegaly  Extremities: extremities normal, atraumatic, no cyanosis or edema  Neurological: Gait normal. Reflexes normal and symmetric.  Sensation grossly normal  Pulse: radial=4/4, femoral=4/4, popliteal=4/4, dorsalis pedis=4/4,   Skin: normal coloration and turgor, no rashes, no suspicious skin lesions noted  Psych: normal    Lab Review: not applicable  Assessment: Stable    Plan:              ECG, UA, fasting labs, and hemoccults  Hypertension - normotemsive, continue to monitor  Hyperlipidemia - await labs, same regimen pending results         ANTHONY Munoz MD

## 2020-08-19 DIAGNOSIS — R73.09 ELEVATED GLUCOSE: ICD-10-CM

## 2020-08-19 DIAGNOSIS — Z00.00 ROUTINE GENERAL MEDICAL EXAMINATION AT A HEALTH CARE FACILITY: ICD-10-CM

## 2020-08-19 DIAGNOSIS — E78.5 HYPERLIPIDEMIA, UNSPECIFIED HYPERLIPIDEMIA TYPE: Chronic | ICD-10-CM

## 2020-08-19 DIAGNOSIS — I10 ESSENTIAL HYPERTENSION, BENIGN: ICD-10-CM

## 2020-08-19 LAB
A/G RATIO: 1.3 (ref 1.1–2.2)
ALBUMIN SERPL-MCNC: 4 G/DL (ref 3.4–5)
ALP BLD-CCNC: 47 U/L (ref 40–129)
ALT SERPL-CCNC: 15 U/L (ref 10–40)
ANION GAP SERPL CALCULATED.3IONS-SCNC: 15 MMOL/L (ref 3–16)
AST SERPL-CCNC: 15 U/L (ref 15–37)
BACTERIA: ABNORMAL /HPF
BASOPHILS ABSOLUTE: 0 K/UL (ref 0–0.2)
BASOPHILS RELATIVE PERCENT: 0.5 %
BILIRUB SERPL-MCNC: <0.2 MG/DL (ref 0–1)
BILIRUBIN URINE: ABNORMAL
BLOOD, URINE: NEGATIVE
BUN BLDV-MCNC: 20 MG/DL (ref 7–20)
CALCIUM SERPL-MCNC: 8.7 MG/DL (ref 8.3–10.6)
CHLORIDE BLD-SCNC: 104 MMOL/L (ref 99–110)
CHOLESTEROL, TOTAL: 190 MG/DL (ref 0–199)
CLARITY: ABNORMAL
CO2: 22 MMOL/L (ref 21–32)
COLOR: ABNORMAL
COMMENT UA: ABNORMAL
CREAT SERPL-MCNC: 0.7 MG/DL (ref 0.6–1.1)
EOSINOPHILS ABSOLUTE: 0.1 K/UL (ref 0–0.6)
EOSINOPHILS RELATIVE PERCENT: 2 %
EPITHELIAL CELLS, UA: 24 /HPF (ref 0–5)
ESTIMATED AVERAGE GLUCOSE: 128.4 MG/DL
GFR AFRICAN AMERICAN: >60
GFR NON-AFRICAN AMERICAN: >60
GLOBULIN: 3 G/DL
GLUCOSE BLD-MCNC: 117 MG/DL (ref 70–99)
GLUCOSE URINE: NEGATIVE MG/DL
HBA1C MFR BLD: 6.1 %
HCT VFR BLD CALC: 39.8 % (ref 36–48)
HDLC SERPL-MCNC: 44 MG/DL (ref 40–60)
HEMOGLOBIN: 12.9 G/DL (ref 12–16)
KETONES, URINE: NEGATIVE MG/DL
LDL CHOLESTEROL CALCULATED: 115 MG/DL
LEUKOCYTE ESTERASE, URINE: NEGATIVE
LYMPHOCYTES ABSOLUTE: 3.2 K/UL (ref 1–5.1)
LYMPHOCYTES RELATIVE PERCENT: 43.2 %
MCH RBC QN AUTO: 27.6 PG (ref 26–34)
MCHC RBC AUTO-ENTMCNC: 32.5 G/DL (ref 31–36)
MCV RBC AUTO: 84.9 FL (ref 80–100)
MICROSCOPIC EXAMINATION: YES
MONOCYTES ABSOLUTE: 0.4 K/UL (ref 0–1.3)
MONOCYTES RELATIVE PERCENT: 5.8 %
NEUTROPHILS ABSOLUTE: 3.6 K/UL (ref 1.7–7.7)
NEUTROPHILS RELATIVE PERCENT: 48.5 %
NITRITE, URINE: NEGATIVE
PDW BLD-RTO: 14 % (ref 12.4–15.4)
PH UA: 5.5 (ref 5–8)
PLATELET # BLD: 199 K/UL (ref 135–450)
PMV BLD AUTO: 10.6 FL (ref 5–10.5)
POTASSIUM SERPL-SCNC: 4.6 MMOL/L (ref 3.5–5.1)
PROTEIN UA: ABNORMAL MG/DL
RBC # BLD: 4.68 M/UL (ref 4–5.2)
RBC UA: 3 /HPF (ref 0–4)
SODIUM BLD-SCNC: 141 MMOL/L (ref 136–145)
SPECIFIC GRAVITY UA: >1.03 (ref 1–1.03)
TOTAL PROTEIN: 7 G/DL (ref 6.4–8.2)
TRIGL SERPL-MCNC: 155 MG/DL (ref 0–150)
TSH SERPL DL<=0.05 MIU/L-ACNC: 1.52 UIU/ML (ref 0.27–4.2)
URINE TYPE: ABNORMAL
UROBILINOGEN, URINE: 0.2 E.U./DL
VLDLC SERPL CALC-MCNC: 31 MG/DL
WBC # BLD: 7.4 K/UL (ref 4–11)
WBC UA: 28 /HPF (ref 0–5)

## 2020-08-20 DIAGNOSIS — R80.9 PROTEINURIA, UNSPECIFIED TYPE: ICD-10-CM

## 2020-08-20 LAB
CREATININE URINE: 345.7 MG/DL (ref 28–259)
MICROALBUMIN UR-MCNC: <1.2 MG/DL
MICROALBUMIN/CREAT UR-RTO: ABNORMAL MG/G (ref 0–30)

## 2020-08-21 LAB — URINE CULTURE, ROUTINE: NORMAL

## 2020-08-25 ENCOUNTER — TELEPHONE (OUTPATIENT)
Dept: INTERNAL MEDICINE CLINIC | Age: 60
End: 2020-08-25

## 2020-08-25 RX ORDER — BUTALBITAL, ACETAMINOPHEN AND CAFFEINE 50; 325; 40 MG/1; MG/1; MG/1
1 TABLET ORAL EVERY 4 HOURS PRN
Qty: 20 TABLET | Refills: 0 | Status: SHIPPED | OUTPATIENT
Start: 2020-08-25 | End: 2020-10-02 | Stop reason: SDUPTHER

## 2020-08-25 RX ORDER — BUTALBITAL, ACETAMINOPHEN AND CAFFEINE 50; 325; 40 MG/1; MG/1; MG/1
1 TABLET ORAL EVERY 4 HOURS PRN
COMMUNITY
End: 2020-08-25 | Stop reason: SDUPTHER

## 2020-08-25 NOTE — TELEPHONE ENCOUNTER
She requested butabratol/acetaminaphin/caffeine labs. Last filled July 20 #20. She doesn't see the neurologist until October.   Can you fill this?  [facility] [phone]

## 2020-09-09 LAB
CONTROL: NORMAL
HEMOCCULT STL QL: NEGATIVE

## 2020-09-09 PROCEDURE — 82274 ASSAY TEST FOR BLOOD FECAL: CPT | Performed by: INTERNAL MEDICINE

## 2020-09-14 RX ORDER — OMEPRAZOLE 20 MG/1
CAPSULE, DELAYED RELEASE ORAL
Qty: 180 CAPSULE | Refills: 3 | Status: SHIPPED | OUTPATIENT
Start: 2020-09-14 | End: 2020-10-06 | Stop reason: SDUPTHER

## 2020-10-02 ENCOUNTER — TELEPHONE (OUTPATIENT)
Dept: FAMILY MEDICINE CLINIC | Age: 60
End: 2020-10-02

## 2020-10-02 RX ORDER — BUTALBITAL, ACETAMINOPHEN AND CAFFEINE 50; 325; 40 MG/1; MG/1; MG/1
1 TABLET ORAL EVERY 4 HOURS PRN
Qty: 20 TABLET | Refills: 0 | Status: SHIPPED | OUTPATIENT
Start: 2020-10-02 | End: 2020-10-06 | Stop reason: SDUPTHER

## 2020-10-02 NOTE — TELEPHONE ENCOUNTER
----- Message from [facility] sent at 10/1/2020 11:32 AM EDT -----  Subject: Appointment Request    Reason for Call: Routine Pre-Op    QUESTIONS  Type of Appointment? Established Patient  Reason for appointment request? No appointments available during search  Additional Information for Provider? been having migraines for the past 3   days and is out of medicine. mbr has appointment for 10/6/2020 but would   like to come in sooner due to pain level.   ---------------------------------------------------------------------------  --------------  CALL BACK INFO  What is the best way for the office to contact you? OK to leave message on   voicemail  Preferred Call Back Phone Number? [phone]  ---------------------------------------------------------------------------  --------------  SCRIPT ANSWERS  Relationship to Patient? Self  Appointment reason? Symptomatic  Select script based on patient symptoms? Adult Pre-Op  Do you have question for your provider that need to be answered prior to   scheduling your pre-op appointment? No  Have you been diagnosed with   tested for   or told that you are suspected of having COVID-19 (Coronavirus)? No  Have you had a fever or taken medication to treat a fever within the past   3 days? No  Have you had a cough   shortness of breath or flu-like symptoms within the past 3 days? No  Do you currently have flu-like symptoms including fever or chills   cough   shortness of breath   or difficulty breathing   or new loss of taste or smell? No  (Service Expert  click yes below to proceed with [facility] As Usual   Scheduling)?  Yes

## 2020-10-06 ENCOUNTER — OFFICE VISIT (OUTPATIENT)
Dept: FAMILY MEDICINE CLINIC | Age: 60
End: 2020-10-06
Payer: COMMERCIAL

## 2020-10-06 VITALS
HEART RATE: 86 BPM | SYSTOLIC BLOOD PRESSURE: 126 MMHG | TEMPERATURE: 97.3 F | BODY MASS INDEX: 38.11 KG/M2 | WEIGHT: 222 LBS | DIASTOLIC BLOOD PRESSURE: 80 MMHG | OXYGEN SATURATION: 96 %

## 2020-10-06 PROCEDURE — 99213 OFFICE O/P EST LOW 20 MIN: CPT | Performed by: NURSE PRACTITIONER

## 2020-10-06 PROCEDURE — 90686 IIV4 VACC NO PRSV 0.5 ML IM: CPT | Performed by: NURSE PRACTITIONER

## 2020-10-06 PROCEDURE — 90471 IMMUNIZATION ADMIN: CPT | Performed by: NURSE PRACTITIONER

## 2020-10-06 RX ORDER — BUTALBITAL, ACETAMINOPHEN AND CAFFEINE 50; 325; 40 MG/1; MG/1; MG/1
1 TABLET ORAL EVERY 4 HOURS PRN
Qty: 20 TABLET | Refills: 0 | Status: SHIPPED | OUTPATIENT
Start: 2020-10-06 | End: 2020-10-07 | Stop reason: SDUPTHER

## 2020-10-06 RX ORDER — OMEPRAZOLE 20 MG/1
20 CAPSULE, DELAYED RELEASE ORAL DAILY
Qty: 180 CAPSULE | Refills: 3 | Status: SHIPPED | OUTPATIENT
Start: 2020-10-06 | End: 2021-01-04 | Stop reason: SDUPTHER

## 2020-10-06 ASSESSMENT — PATIENT HEALTH QUESTIONNAIRE - PHQ9
SUM OF ALL RESPONSES TO PHQ QUESTIONS 1-9: 0
SUM OF ALL RESPONSES TO PHQ QUESTIONS 1-9: 0
1. LITTLE INTEREST OR PLEASURE IN DOING THINGS: 0
SUM OF ALL RESPONSES TO PHQ9 QUESTIONS 1 & 2: 0
2. FEELING DOWN, DEPRESSED OR HOPELESS: 0

## 2020-10-06 NOTE — PROGRESS NOTES
Subjective:      Patient ID: Abby Bishop is a 63 y.o. female who presents for:   Chief Complaint   Patient presents with    Established New Doctor   Migraines for over 40 years- whole head. Drumming and throbbing. Starts on left. Stabbing pains.- This is her aura. Experiences photo and phonophobia, nausea, loss of balance,  Controlled with acupuncture and Botox, Zomig, betablocker in past- stopped working in . Has has recurrent migraine since that time  Last one yesterday- lasts days generally- may have up to 50% of the month  15 days in month of september    Review of Systems   Neurological: Positive for headaches. All other systems reviewed and are negative.     Past Medical History:   Diagnosis Date    Essential hypertension, benign 2011    GERD (gastroesophageal reflux disease) 2016    Hyperlipidemia 2011    Migraine     Osteoarthritis of right knee 2011    Pyriformis syndrome 2012    Screening mammogram for high-risk patient *2019    Benign    Screening mammogram, encounter for *May 26, 2020     Past Surgical History:   Procedure Laterality Date    COLONOSCOPY  *May 13, 2016 (  )    Dr. Rosita Vernon - diverticulosis, polyp ( colonoc mucosa )    Nahomi Rivers      Dr. Paco Vega  *May, 2016    Dr. Rosita Vernon - erosive esophagitis    Leopoldo Mealing  *2019    Dr. Rosita Vernon - gastric nodule ( biopsy benign )     Social History     Social History Narrative    Living Will:  No     Family History   Problem Relation Age of Onset    Cancer Maternal Aunt     Cancer Maternal Grandmother     Cancer Paternal Grandfather     Other Father          - cause unknown     Diabetes Mother 80        Alive - DM, Hypertension, heart disease     Rheum Arthritis Neg Hx     Osteoarthritis Neg Hx     Asthma Neg Hx     Breast Cancer Neg Hx  Heart Failure Neg Hx     High Cholesterol Neg Hx     Hypertension Neg Hx     Migraines Neg Hx     Ovarian Cancer Neg Hx     Rashes/Skin Problems Neg Hx     Seizures Neg Hx     Stroke Neg Hx     Thyroid Disease Neg Hx      Social History     Tobacco Use    Smoking status: Former Smoker     Packs/day: 0.50     Years: 13.00     Pack years: 6.50     Last attempt to quit: 2000     Years since quittin.7    Smokeless tobacco: Never Used   Substance Use Topics    Alcohol use: Yes     Alcohol/week: 0.0 standard drinks     Comment: occas     Allergies   Allergen Reactions    Penicillins Shortness Of Breath, Swelling and Itching     hives  hives  Blisters in the mouth and hives      Tramadol Other (See Comments)     Chest pain  Chest pains  Chest pains       Current Outpatient Medications   Medication Sig Dispense Refill    Erenumab-aooe 70 MG/ML SOAJ Inject 1 pen monthly 3 pen 3    butalbital-acetaminophen-caffeine (FIORICET, ESGIC) -40 MG per tablet Take 1 tablet by mouth every 4 hours as needed for Headaches 20 tablet 0    omeprazole (PRILOSEC) 20 MG delayed release capsule Take 1 capsule by mouth Daily 180 capsule 3    naproxen (NAPROSYN) 500 MG tablet TAKE 1 TABLET BY MOUTH TWICE DAILY ON A FULL STOMACH AS NEEDED FOR PAIN 30 tablet 12    aspirin 81 MG chewable tablet Take 1 tablet by mouth daily 30 tablet 3     No current facility-administered medications for this visit. Patient's past medical history, surgical history, family history, medications,  andallergies  were all reviewed and updated as appropriate today. Objective:     Vitals:    10/06/20 1310   BP: 126/80   Pulse:    Temp:    SpO2:      Physical Exam  Constitutional:       Appearance: She is well-developed. HENT:      Head: Normocephalic. Eyes:      Pupils: Pupils are equal, round, and reactive to light. Neck:      Musculoskeletal: Normal range of motion. Cardiovascular:      Rate and Rhythm: Normal rate. Pulmonary:      Effort: Pulmonary effort is normal.   Musculoskeletal: Normal range of motion. Skin:     General: Skin is warm and dry. Neurological:      General: No focal deficit present. Mental Status: She is alert and oriented to person, place, and time. Mental status is at baseline. Psychiatric:         Mood and Affect: Mood normal.         Thought Content: Thought content normal.         Judgment: Judgment normal.         Assessment      Encounter Diagnoses   Name Primary?  Heart burn     Dysphagia, unspecified type     Intractable chronic migraine without aura and without status migrainosus Yes       PLAN:  Health Maintenance   Topic Date Due    Cervical cancer screen  10/06/2021 (Originally 5/2/2019)    Shingles Vaccine (1 of 2) 12/31/2099 (Originally 12/16/2011)    A1C test (Diabetic or Prediabetic)  08/19/2021    Potassium monitoring  08/19/2021    Creatinine monitoring  08/19/2021    Breast cancer screen  05/26/2022    Lipid screen  08/19/2025    Colon cancer screen colonoscopy  05/13/2026    DTaP/Tdap/Td vaccine (2 - Td) 09/01/2028    Flu vaccine  Completed    Hepatitis C screen  Completed    HIV screen  Completed    Hepatitis A vaccine  Aged Out    Hepatitis B vaccine  Aged Out    Hib vaccine  Aged Out    Meningococcal (ACWY) vaccine  Aged Out    Pneumococcal 0-64 years Vaccine  Aged Out     1. Heart burn  Controlled with Prilosec  - omeprazole (PRILOSEC) 20 MG delayed release capsule; Take 1 capsule by mouth Daily  Dispense: 180 capsule; Refill: 3    2. Dysphagia, unspecified type  - omeprazole (PRILOSEC) 20 MG delayed release capsule; Take 1 capsule by mouth Daily  Dispense: 180 capsule; Refill: 3    3. Intractable chronic migraine without aura and without status migrainosus  No longer controlled with her previous medications will try monthly injectables.   Patient given first injectable here in the office reports her daughter is a nurse and is able to do her follow-up injections if she cannot do them herself. 2 samples given with coupons for further refills. Instructed her to keep a log of her migraines including aura and effectiveness of Fioricet to follow-up with me in 3 months. - Erenumab-aooe 70 MG/ML SOAJ; Inject 1 pen monthly  Dispense: 3 pen; Refill: 3  - butalbital-acetaminophen-caffeine (FIORICET, ESGIC) -40 MG per tablet; Take 1 tablet by mouth every 4 hours as needed for Headaches  Dispense: 20 tablet; Refill: 0  2 shot given today    No follow-ups on file.     ANTONETTE Gerber - CNP  [place]  10/6/2020

## 2020-10-07 ENCOUNTER — PATIENT MESSAGE (OUTPATIENT)
Dept: FAMILY MEDICINE CLINIC | Age: 60
End: 2020-10-07

## 2020-10-07 RX ORDER — BUTALBITAL, ACETAMINOPHEN AND CAFFEINE 50; 325; 40 MG/1; MG/1; MG/1
1 TABLET ORAL EVERY 4 HOURS PRN
Qty: 20 TABLET | Refills: 0 | Status: SHIPPED | OUTPATIENT
Start: 2020-10-07 | End: 2020-10-08

## 2020-10-07 NOTE — TELEPHONE ENCOUNTER
From: Deborah Maurer  To: Renato Martinez, APRN - CNP  Sent: 10/7/2020 11:40 AM EDT  Subject: Prescription Question    Good Afternoon Dr. Nova Comes. Sorry to bother you. Can you change the butabital to But/Acetaminophen Caff -40 CP? I believe the name brand is Fioricet. I did not  the butablital, since those do not work. Thank you.   Bina Wilkins

## 2020-10-08 RX ORDER — BUTALBITAL, ACETAMINOPHEN AND CAFFEINE 300; 40; 50 MG/1; MG/1; MG/1
1 CAPSULE ORAL EVERY 4 HOURS PRN
Qty: 50 CAPSULE | Refills: 0 | Status: SHIPPED | OUTPATIENT
Start: 2020-10-08 | End: 2020-10-27 | Stop reason: SDUPTHER

## 2020-10-27 RX ORDER — UBROGEPANT 50 MG/1
50 TABLET ORAL DAILY PRN
Qty: 30 TABLET | Refills: 5 | Status: ON HOLD | OUTPATIENT
Start: 2020-10-27 | End: 2021-08-31

## 2020-10-27 RX ORDER — BUTALBITAL, ACETAMINOPHEN AND CAFFEINE 300; 40; 50 MG/1; MG/1; MG/1
1 CAPSULE ORAL EVERY 4 HOURS PRN
Qty: 50 CAPSULE | Refills: 0 | Status: SHIPPED | OUTPATIENT
Start: 2020-10-27 | End: 2020-11-23 | Stop reason: SDUPTHER

## 2020-11-13 ENCOUNTER — PATIENT MESSAGE (OUTPATIENT)
Dept: FAMILY MEDICINE CLINIC | Age: 60
End: 2020-11-13

## 2020-11-13 NOTE — TELEPHONE ENCOUNTER
From: Deborah Maurer  To: Jim Don APRN - CNP  Sent: 11/13/2020 3:02 PM EST  Subject: Non-Urgent Medical Question    Good Afternoon Dr. Sukumar Antunez. Touching base on 2 things. I did do my injection and so far this month I have only had 4 migraines, that didnt last beyond the day. I tried the [place] and [place] again and felt the tightness in my chest, so I will not take those. I used the fioricet capsule yesterday and it went away. Also, the [place] and [place] didnt take the headache away after taking 1, then taking another 2 hours later. I think I will stick with the injection and fiorcet capsule. 2nd issue. My finger is not getting better. (The trigger finger) I have much difficulty holding pencil, toothbrush, hairbrush and the shooting pain is waking me up. I guess I should see an Ortho Dr.? Please let me know what I will need to do? Thank you and have a good weekend.

## 2020-11-24 RX ORDER — BUTALBITAL, ACETAMINOPHEN AND CAFFEINE 300; 40; 50 MG/1; MG/1; MG/1
1 CAPSULE ORAL EVERY 4 HOURS PRN
Qty: 50 CAPSULE | Refills: 0 | Status: SHIPPED | OUTPATIENT
Start: 2020-11-24 | End: 2020-12-16 | Stop reason: SDUPTHER

## 2020-11-25 ENCOUNTER — TELEPHONE (OUTPATIENT)
Dept: ORTHOPEDIC SURGERY | Age: 60
End: 2020-11-25

## 2020-11-25 NOTE — TELEPHONE ENCOUNTER
PA submitted via [facility] for Nurtec 75MG dispersible tablets.   (Robb Ramirez) Rx #: [id number]    STATUS: PENDING

## 2020-12-02 NOTE — TELEPHONE ENCOUNTER
Received APPROVAL for Nurtec 75MG dispersible tablets from 11/23/2020 to 05/22/2021. Approval letter attached. Please advise patient. Thank you.

## 2020-12-07 ENCOUNTER — OFFICE VISIT (OUTPATIENT)
Dept: PRIMARY CARE CLINIC | Age: 60
End: 2020-12-07
Payer: COMMERCIAL

## 2020-12-07 PROCEDURE — 99211 OFF/OP EST MAY X REQ PHY/QHP: CPT | Performed by: NURSE PRACTITIONER

## 2020-12-07 NOTE — PROGRESS NOTES
Deborah Maurer received a viral test for COVID-19. They were educated on isolation and quarantine as appropriate. For any symptoms, they were directed to seek care from their PCP, given contact information to establish with a doctor, directed to an urgent care or the emergency room.

## 2020-12-08 LAB — SARS-COV-2, NAA: NOT DETECTED

## 2020-12-16 RX ORDER — BUTALBITAL, ACETAMINOPHEN AND CAFFEINE 300; 40; 50 MG/1; MG/1; MG/1
1 CAPSULE ORAL EVERY 4 HOURS PRN
Qty: 50 CAPSULE | Refills: 0 | Status: SHIPPED | OUTPATIENT
Start: 2020-12-16 | End: 2021-01-13 | Stop reason: SDUPTHER

## 2020-12-31 ENCOUNTER — TELEPHONE (OUTPATIENT)
Dept: FAMILY MEDICINE CLINIC | Age: 60
End: 2020-12-31

## 2021-01-04 ENCOUNTER — TELEPHONE (OUTPATIENT)
Dept: FAMILY MEDICINE CLINIC | Age: 61
End: 2021-01-04

## 2021-01-04 DIAGNOSIS — R12 HEART BURN: ICD-10-CM

## 2021-01-04 DIAGNOSIS — R13.10 DYSPHAGIA, UNSPECIFIED TYPE: ICD-10-CM

## 2021-01-04 RX ORDER — OMEPRAZOLE 20 MG/1
20 CAPSULE, DELAYED RELEASE ORAL 2 TIMES DAILY
Qty: 180 CAPSULE | Refills: 3 | Status: ON HOLD | OUTPATIENT
Start: 2021-01-04 | End: 2021-08-31 | Stop reason: SDUPTHER

## 2021-01-04 NOTE — TELEPHONE ENCOUNTER
Pharmacy calling stating that patient said there are new instructions on her Omeprazole.  Please send new script over

## 2021-01-08 RX ORDER — RIMEGEPANT SULFATE 75 MG/75MG
75 TABLET, ORALLY DISINTEGRATING ORAL
COMMUNITY
End: 2021-01-08 | Stop reason: SDUPTHER

## 2021-01-11 RX ORDER — RIMEGEPANT SULFATE 75 MG/75MG
TABLET, ORALLY DISINTEGRATING ORAL
Qty: 15 TABLET | Refills: 1 | Status: SHIPPED | OUTPATIENT
Start: 2021-01-11 | End: 2021-09-09

## 2021-01-13 RX ORDER — BUTALBITAL, ACETAMINOPHEN AND CAFFEINE 300; 40; 50 MG/1; MG/1; MG/1
1 CAPSULE ORAL EVERY 4 HOURS PRN
Qty: 50 CAPSULE | Refills: 0 | Status: SHIPPED | OUTPATIENT
Start: 2021-01-13 | End: 2021-03-18 | Stop reason: SDUPTHER

## 2021-02-01 RX ORDER — BUTALBITAL, ACETAMINOPHEN AND CAFFEINE 300; 40; 50 MG/1; MG/1; MG/1
1 CAPSULE ORAL EVERY 4 HOURS PRN
Qty: 50 CAPSULE | Refills: 0 | OUTPATIENT
Start: 2021-02-01

## 2021-02-01 NOTE — TELEPHONE ENCOUNTER
She said it has been years since she saw a neurologist - who do you recommend? She usually sees an acupuncturist but hers is on sabbatical.  Can't find a new one because of covid. She'll use Tylenol for now.

## 2021-03-18 ENCOUNTER — PATIENT MESSAGE (OUTPATIENT)
Dept: FAMILY MEDICINE CLINIC | Age: 61
End: 2021-03-18

## 2021-03-18 RX ORDER — BUTALBITAL, ACETAMINOPHEN AND CAFFEINE 300; 40; 50 MG/1; MG/1; MG/1
1 CAPSULE ORAL EVERY 4 HOURS PRN
Qty: 50 CAPSULE | Refills: 0 | Status: SHIPPED | OUTPATIENT
Start: 2021-03-18 | End: 2021-05-13 | Stop reason: SDUPTHER

## 2021-05-27 ENCOUNTER — TELEPHONE (OUTPATIENT)
Dept: ORTHOPEDIC SURGERY | Age: 61
End: 2021-05-27

## 2021-06-28 ENCOUNTER — TELEPHONE (OUTPATIENT)
Dept: ORTHOPEDIC SURGERY | Age: 61
End: 2021-06-28

## 2021-06-28 RX ORDER — BUTALBITAL, ACETAMINOPHEN AND CAFFEINE 300; 40; 50 MG/1; MG/1; MG/1
CAPSULE ORAL
Qty: 25 CAPSULE | Refills: 0 | Status: SHIPPED | OUTPATIENT
Start: 2021-06-28 | End: 2021-08-18 | Stop reason: SDUPTHER

## 2021-06-28 NOTE — TELEPHONE ENCOUNTER
Received a PA request for Ubrelvy 50MG tablets. Is patient taking this again? Thought she stopped using it due to tightness in her chest.    Please advise. Please respond to the pool ( [facility] [address] [facility]). Thank you!

## 2021-07-12 ENCOUNTER — HOSPITAL ENCOUNTER (OUTPATIENT)
Dept: MAMMOGRAPHY | Age: 61
Discharge: HOME | End: 2021-07-12
Payer: COMMERCIAL

## 2021-07-12 VITALS — WEIGHT: 214 LBS | HEIGHT: 64 IN | BODY MASS INDEX: 36.54 KG/M2

## 2021-07-12 DIAGNOSIS — Z12.31 VISIT FOR SCREENING MAMMOGRAM: ICD-10-CM

## 2021-07-12 PROCEDURE — 77067 SCR MAMMO BI INCL CAD: CPT

## 2021-07-15 ENCOUNTER — HOSPITAL ENCOUNTER (OUTPATIENT)
Dept: ULTRASOUND IMAGING | Age: 61
Discharge: HOME | End: 2021-07-15
Payer: COMMERCIAL

## 2021-07-15 ENCOUNTER — HOSPITAL ENCOUNTER (OUTPATIENT)
Dept: MAMMOGRAPHY | Age: 61
End: 2021-07-15
Payer: COMMERCIAL

## 2021-07-15 DIAGNOSIS — R92.8 ABNORMAL MAMMOGRAM: ICD-10-CM

## 2021-07-15 PROCEDURE — 76642 ULTRASOUND BREAST LIMITED: CPT

## 2021-07-28 RX ORDER — NAPROXEN 500 MG/1
TABLET ORAL
Qty: 30 TABLET | Refills: 12 | Status: SHIPPED | OUTPATIENT
Start: 2021-07-28 | End: 2022-08-04

## 2021-08-18 ENCOUNTER — PATIENT MESSAGE (OUTPATIENT)
Dept: FAMILY MEDICINE CLINIC | Age: 61
End: 2021-08-18

## 2021-08-18 RX ORDER — BUTALBITAL, ACETAMINOPHEN AND CAFFEINE 300; 40; 50 MG/1; MG/1; MG/1
CAPSULE ORAL
Qty: 25 CAPSULE | Refills: 0 | Status: SHIPPED | OUTPATIENT
Start: 2021-08-18 | End: 2021-10-19 | Stop reason: SDUPTHER

## 2021-08-30 ENCOUNTER — ANESTHESIA EVENT (OUTPATIENT)
Dept: ENDOSCOPY | Age: 61
End: 2021-08-30
Payer: COMMERCIAL

## 2021-08-31 ENCOUNTER — HOSPITAL ENCOUNTER (OUTPATIENT)
Age: 61
Setting detail: OUTPATIENT SURGERY
Discharge: HOME | End: 2021-08-31
Attending: INTERNAL MEDICINE | Admitting: INTERNAL MEDICINE
Payer: COMMERCIAL

## 2021-08-31 ENCOUNTER — ANESTHESIA (OUTPATIENT)
Dept: ENDOSCOPY | Age: 61
End: 2021-08-31
Payer: COMMERCIAL

## 2021-08-31 VITALS
OXYGEN SATURATION: 100 % | SYSTOLIC BLOOD PRESSURE: 123 MMHG | DIASTOLIC BLOOD PRESSURE: 78 MMHG | RESPIRATION RATE: 18 BRPM | TEMPERATURE: 97.3 F | HEIGHT: 64 IN | WEIGHT: 198 LBS | BODY MASS INDEX: 33.8 KG/M2 | HEART RATE: 73 BPM

## 2021-08-31 VITALS
SYSTOLIC BLOOD PRESSURE: 119 MMHG | DIASTOLIC BLOOD PRESSURE: 74 MMHG | RESPIRATION RATE: 21 BRPM | OXYGEN SATURATION: 98 %

## 2021-08-31 DIAGNOSIS — R13.10 DYSPHAGIA, UNSPECIFIED TYPE: ICD-10-CM

## 2021-08-31 DIAGNOSIS — K22.9 NODULE OF ESOPHAGUS: ICD-10-CM

## 2021-08-31 DIAGNOSIS — R12 HEART BURN: ICD-10-CM

## 2021-08-31 PROCEDURE — 3609018500 HC EGD US SCOPE W/ADJACENT STRUCTURES: Performed by: INTERNAL MEDICINE

## 2021-08-31 PROCEDURE — 3700000001 HC ADD 15 MINUTES (ANESTHESIA): Performed by: INTERNAL MEDICINE

## 2021-08-31 PROCEDURE — 3700000000 HC ANESTHESIA ATTENDED CARE: Performed by: INTERNAL MEDICINE

## 2021-08-31 PROCEDURE — 2500000003 HC RX 250 WO HCPCS: Performed by: NURSE ANESTHETIST, CERTIFIED REGISTERED

## 2021-08-31 PROCEDURE — 6360000002 HC RX W HCPCS: Performed by: NURSE ANESTHETIST, CERTIFIED REGISTERED

## 2021-08-31 PROCEDURE — 7100000010 HC PHASE II RECOVERY - FIRST 15 MIN: Performed by: INTERNAL MEDICINE

## 2021-08-31 PROCEDURE — 2580000003 HC RX 258: Performed by: ANESTHESIOLOGY

## 2021-08-31 PROCEDURE — 88341 IMHCHEM/IMCYTCHM EA ADD ANTB: CPT

## 2021-08-31 PROCEDURE — 3609155300 HC EGD W ENDOSCOPIC MUCOSAL RESECTION: Performed by: INTERNAL MEDICINE

## 2021-08-31 PROCEDURE — 2709999900 HC NON-CHARGEABLE SUPPLY: Performed by: INTERNAL MEDICINE

## 2021-08-31 PROCEDURE — 88305 TISSUE EXAM BY PATHOLOGIST: CPT

## 2021-08-31 PROCEDURE — 7100000011 HC PHASE II RECOVERY - ADDTL 15 MIN: Performed by: INTERNAL MEDICINE

## 2021-08-31 PROCEDURE — 88342 IMHCHEM/IMCYTCHM 1ST ANTB: CPT

## 2021-08-31 RX ORDER — LIDOCAINE HYDROCHLORIDE 10 MG/ML
INJECTION, SOLUTION EPIDURAL; INFILTRATION; INTRACAUDAL; PERINEURAL PRN
Status: DISCONTINUED | OUTPATIENT
Start: 2021-08-31 | End: 2021-08-31 | Stop reason: SDUPTHER

## 2021-08-31 RX ORDER — PROPOFOL 10 MG/ML
INJECTION, EMULSION INTRAVENOUS PRN
Status: DISCONTINUED | OUTPATIENT
Start: 2021-08-31 | End: 2021-08-31 | Stop reason: SDUPTHER

## 2021-08-31 RX ORDER — SUCRALFATE ORAL 1 G/10ML
1 SUSPENSION ORAL 4 TIMES DAILY
Qty: 280 ML | Refills: 0 | Status: SHIPPED | OUTPATIENT
Start: 2021-08-31 | End: 2022-08-01 | Stop reason: ALTCHOICE

## 2021-08-31 RX ORDER — SODIUM CHLORIDE, SODIUM LACTATE, POTASSIUM CHLORIDE, CALCIUM CHLORIDE 600; 310; 30; 20 MG/100ML; MG/100ML; MG/100ML; MG/100ML
INJECTION, SOLUTION INTRAVENOUS CONTINUOUS
Status: DISCONTINUED | OUTPATIENT
Start: 2021-08-31 | End: 2021-08-31 | Stop reason: HOSPADM

## 2021-08-31 RX ORDER — OMEPRAZOLE 20 MG/1
40 CAPSULE, DELAYED RELEASE ORAL 2 TIMES DAILY
Qty: 240 CAPSULE | Refills: 0 | Status: SHIPPED | OUTPATIENT
Start: 2021-08-31 | End: 2022-01-24

## 2021-08-31 RX ORDER — PROPOFOL 10 MG/ML
INJECTION, EMULSION INTRAVENOUS CONTINUOUS PRN
Status: DISCONTINUED | OUTPATIENT
Start: 2021-08-31 | End: 2021-08-31 | Stop reason: SDUPTHER

## 2021-08-31 RX ADMIN — LIDOCAINE HYDROCHLORIDE 10 MG: 10 INJECTION, SOLUTION EPIDURAL; INFILTRATION; INTRACAUDAL; PERINEURAL at 10:44

## 2021-08-31 RX ADMIN — SODIUM CHLORIDE, POTASSIUM CHLORIDE, SODIUM LACTATE AND CALCIUM CHLORIDE: 600; 310; 30; 20 INJECTION, SOLUTION INTRAVENOUS at 10:08

## 2021-08-31 RX ADMIN — LIDOCAINE HYDROCHLORIDE 75 MG: 10 INJECTION, SOLUTION EPIDURAL; INFILTRATION; INTRACAUDAL; PERINEURAL at 10:22

## 2021-08-31 RX ADMIN — PROPOFOL 150 MCG/KG/MIN: 10 INJECTION, EMULSION INTRAVENOUS at 10:22

## 2021-08-31 RX ADMIN — PROPOFOL 125 MCG/KG/MIN: 10 INJECTION, EMULSION INTRAVENOUS at 10:28

## 2021-08-31 RX ADMIN — PROPOFOL 25 MG: 10 INJECTION, EMULSION INTRAVENOUS at 10:32

## 2021-08-31 RX ADMIN — PROPOFOL 25 MG: 10 INJECTION, EMULSION INTRAVENOUS at 10:44

## 2021-08-31 RX ADMIN — PROPOFOL 100 MG: 10 INJECTION, EMULSION INTRAVENOUS at 10:22

## 2021-08-31 RX ADMIN — LIDOCAINE HYDROCHLORIDE 15 MG: 10 INJECTION, SOLUTION EPIDURAL; INFILTRATION; INTRACAUDAL; PERINEURAL at 10:32

## 2021-08-31 ASSESSMENT — PULMONARY FUNCTION TESTS
PIF_VALUE: 1

## 2021-08-31 ASSESSMENT — PAIN SCALES - GENERAL
PAINLEVEL_OUTOF10: 0

## 2021-08-31 ASSESSMENT — PAIN - FUNCTIONAL ASSESSMENT: PAIN_FUNCTIONAL_ASSESSMENT: 0-10

## 2021-08-31 NOTE — ANESTHESIA POSTPROCEDURE EVALUATION
Department of Anesthesiology  Postprocedure Note    Patient: Trinidad Rodriguez  MRN: [id number]  YOB: 1960  Date of evaluation: 8/31/2021  Time:  4:46 PM     Procedure Summary     Date: 08/31/21 Room / Location: [facility]    Anesthesia Start: 1019 Anesthesia Stop: 1100    Procedures:       EGD ESOPHAGOGASTRODUODENOSCOPY ULTRASOUND (N/A )      EGD W/ EMR (N/A ) Diagnosis:       Nodule of esophagus      (Nodule of esophagus [K22.9])    Surgeons: Petra Mancuso MD Responsible Provider: Denisa Victor DO    Anesthesia Type: MAC ASA Status: 2          Anesthesia Type: MAC    Scarlett Phase I: Scarlett Score: 10    Scarlett Phase II: Scarlett Score: 10    Last vitals: Reviewed and per EMR flowsheets.        Anesthesia Post Evaluation    Patient location during evaluation: bedside  Patient participation: complete - patient participated  Level of consciousness: awake  Pain score: 0  Airway patency: patent  Nausea & Vomiting: no nausea and no vomiting  Complications: no  Cardiovascular status: blood pressure returned to baseline  Respiratory status: acceptable  Hydration status: euvolemic

## 2021-08-31 NOTE — PROCEDURES
ENDOSCOPIC ULTRASOUND (EUS) REPORT    Patient:  [facility]    Referring Physician:  Washington Hairston     Indication:  Esophageal nodule     Medications:  MAC      Pre-Anesthesia Assessment:  I have reviewed and am in agreement with patient history and medication, including previous response to sedation in the nursing record. A full history and physical and physical was performed. I discussed the risks and benefits of the procedure with the patient including but not limited to infection, bleeding, perforation, medication reaction, and death. In addition I discussed that fine needle aspiration may result in an increased risk of infection, bleeding, perforation, and pancreatitis. The patient elected to have the procedure performed and informed consent was obtained. The patient was brought to the room, a time out was performed and the patient and staff were in agreement that it was the correct patient and procedure. Mallampatti: II  ASA Grade Assessment:3    The plan was to use MAC anesthesia. Vital signs and heart rhythm were monitored prior to and throughout the entire procedure. The patient was reassessed and then adequate sedation was then provided in stepwise fashion. The heart rate, respiratory rate, oxygen saturations, blood pressure, adequacy of pulmonary ventilation, and response to care were monitored throughout the procedure. The physical status of the patient was re-assessed after the procedure. The gastroscope and Olympus radial echoendoscope were introduced through the mouth, and advanced to the second part of duodenum. The upper EUS was accomplished without difficulty. The patient tolerated the procedure well. An endosonoscope with 7. 5MHz processor and color doppler were used throughout the procedure. I performed real time sonographic image interpretation without the assistance of a radiologist.     There were no immediate apparent complications.   EBL: none    Endoscopic Findings:  Duodenum- normal  Stomach- normal; retroflexed views of the stomach are normal.   Esophagus-EGJ normal at 37cms; 7mm nodule at 35cms. Findings:  Celiac: Normal  Lymph nodes: no upper abdominal or para-esophageal lymphadenopathy. Limited imaging of the left lobe of the liver shows mild hyperechoic changes consistent with fatty infiltration  The left adrenal gland was normal.  The pancreatic parenchyma had hyperechoic strands and foci. There was mild lobularity. The pancreatic duct measures 2.8mm in the HOP, 1.7mm in the BOP, and 0.4mm in the TOP. There are hyperechoic duct walls. The biliary tree evaluation shows a CBD diameter of 5.1mm. The GB is normal without stones or sludge  The esophageal nodule is hypoechoic with some heterogeneity. This measures 6.8 mm x 2.2mm. This is doppler negative. There is intact submucosa and muscularis propria deep to the lesion. The involves the muscularis mucosa and superficial mucosa. After the lesion was identified the EMR snare was used to place superficial marks around the lesion to madhu the borders prior to lift. The scope was removed and the banding cap was placed. The scope was then readvanced to the lesion. A total of 4 mL of Orise was placed for lift. The lesion lifted well. Next the lesion was suctioned into the banding cap and a band deployed. The lesion was then removed with a hot snare. The mucosal resection site showed a good blue hue suggesting that the resection was deep enough but there was no obvious perforation. There was no bleeding during or at the end of the procedure.          Impression:  Mild fatty liver disease  Esophageal nodule s/p EMR    Plan:  Call for path in 7 days  BID PPI x 8 weeks  carafate x 1 week  Clears today  Soft diet for a week      Gerald Rubio MD, MD 8/31/2021

## 2021-08-31 NOTE — PROGRESS NOTES
Tolerated procedure well. Denies pain or nausea. No crepitus noted. Written instructions provided after verbal review.

## 2021-08-31 NOTE — H&P
Pre-operative History and Physical    Patient: Buffy Quintero  : 1960      History Obtained From:  patient, electronic medical record    HISTORY OF PRESENT ILLNESS:    The patient is a 62 y.o. female here for Endoscopic ultrasound for esophageal nodule with biopsy vs mucosal resection. Diagnosis Date    Essential hypertension, benign 2011    GERD (gastroesophageal reflux disease) 2016    Hyperlipidemia 2011    Migraine     Osteoarthritis of right knee 2011    Pyriformis syndrome 2012    Screening mammogram for high-risk patient *2019    Benign    Screening mammogram, encounter for *May 26, 2020     Past Surgical History:        Procedure Laterality Date    COLONOSCOPY  *May 13, 2016 (  )    Dr. Dorothea Velasquez - diverticulosis, polyp ( colonoc mucosa )    ECTOPIC PREGNANCY SURGERY      ENDOMETRIAL ABLATION      HYSTERECTOMY      LUMBAR FUSION      Dr. Margot Pacheco  *May, 2016    Dr. Dorothea Velasquez - erosive esophagitis    UPPER GASTROINTESTINAL ENDOSCOPY  *2019    Dr. Dorothea Velasquez - gastric nodule ( biopsy benign )    US BREAST FINE NEEDLE ASPIRATION       Medications Prior to Admission:   No current facility-administered medications on file prior to encounter. Current Outpatient Medications on File Prior to Encounter   Medication Sig Dispense Refill    Rimegepant Sulfate (NURTEC) 75 MG TBDP DISSOLVE 1 TABLET ON THE TONGUE EVERY 48 HOURS AS NEEDED FOR MIGRAINE 15 tablet 1    omeprazole (PRILOSEC) 20 MG delayed release capsule Take 1 capsule by mouth 2 times daily 180 capsule 3    Erenumab-aooe 70 MG/ML SOAJ Inject 1 pen monthly 3 pen 3    aspirin 81 MG chewable tablet Take 1 tablet by mouth daily 30 tablet 3     Allergies:  Penicillins and Tramadol    History of allergic reaction to anesthesia:  No    Social History:   TOBACCO:   reports that she quit smoking about 21 years ago.  She has a 6.50 pack-year smoking history. She has never used smokeless tobacco.  ETOH:   reports previous alcohol use. DRUGS:   reports no history of drug use. Family History:       Problem Relation Age of Onset    Ovarian Cancer Maternal Aunt     Cancer Maternal Grandmother     Cancer Paternal Grandfather     Other Father          - cause unknown     Diabetes Mother 80        Alive - DM, Hypertension, heart disease     Rheum Arthritis Neg Hx     Osteoarthritis Neg Hx     Asthma Neg Hx     Breast Cancer Neg Hx     Heart Failure Neg Hx     High Cholesterol Neg Hx     Hypertension Neg Hx     Migraines Neg Hx     Rashes/Skin Problems Neg Hx     Seizures Neg Hx     Stroke Neg Hx     Thyroid Disease Neg Hx        PHYSICAL EXAM:      /84   Pulse 87   Temp 97.1 °F (36.2 °C) (Temporal)   Resp 16   Ht 5' 4\" (1.626 m)   Wt 198 lb (89.8 kg)   SpO2 96%   BMI 33.99 kg/m²  I        Heart:  Normal apical impulse, regular rate and rhythm, normal S1 and S2, no S3 or S4, and no murmur noted    Lungs:  No increased work of breathing, good air exchange, clear to auscultation bilaterally, no crackles or wheezing    Abdomen:  No scars, normal bowel sounds, soft, non-distended, non-tender, no masses palpated, no hepatosplenomegally      ASA Grade:  ASA 2 - Patient with mild systemic disease with no functional limitations  Mallampati: II    ASSESSMENT AND PLAN:    1. Patient is a 62 y.o. female here for EUS with deep sedation  2. Procedure options, risks and benefits reviewed including but not limited to infection, bleeding, perforation, death, and missed lesions. Specifically discussed with FNA increased risk of bleeding, perforation, infection, and pancreatitis with patient. I discussed that with mucosal resection there is an increased risk (5-10%) of bleeding, perforation, pain, and stricture formation. Patient expresses understanding.

## 2021-08-31 NOTE — ANESTHESIA PRE PROCEDURE
Department of Anesthesiology  Preprocedure Note       Name:  Shirley Koroma   Age:  62 y.o.  :  1960                                          MRN:  [id number]         Date:  2021      Surgeon: Everton Cisse):  Tee Bowden MD    Procedure: Procedure(s):  ESOPHAGOGASTRODUODENOSCOPY WITH ENDOSCOPIC ULTRASOUND    Medications prior to admission:   Prior to Admission medications    Medication Sig Start Date End Date Taking? Authorizing Provider   butalbital-APAP-caffeine -40 MG CAPS per capsule TAKE 1 CAPSULE BY MOUTH EVERY 4 HOURS AS NEEDED FOR HEADACHE 21  Yes Lazara Odom APRN - CNP   naproxen (NAPROSYN) 500 MG tablet TAKE 1 TABLET BY MOUTH TWICE DAILY ON A FULL STOMACH AS NEEDED FOR PAIN 21  Yes Chidi Grief, APRN - CNP   Rimegepant Sulfate (NURTEC) 75 MG TBDP DISSOLVE 1 TABLET ON THE TONGUE EVERY 48 HOURS AS NEEDED FOR MIGRAINE 21  Yes Chidi Grief, APRN - CNP   omeprazole (PRILOSEC) 20 MG delayed release capsule Take 1 capsule by mouth 2 times daily 21  Yes Chidi Grief, APRN - CNP   Erenumab-aooe 70 MG/ML SOAJ Inject 1 pen monthly 10/6/20  Yes Chidi Grief, APRN - CNP   aspirin 81 MG chewable tablet Take 1 tablet by mouth daily 19  Yes Melvin Bal MD       Current medications:    Current Facility-Administered Medications   Medication Dose Route Frequency Provider Last Rate Last Admin    lactated ringers infusion   IntraVENous Continuous Oly Jackson  mL/hr at 21 1008 New Bag at 21 1008       Allergies:     Allergies   Allergen Reactions    Penicillins Shortness Of Breath, Swelling and Itching     hives  hives  Blisters in the mouth and hives      Tramadol Other (See Comments)     Chest pain  Chest pains  Chest pains         Problem List:    Patient Active Problem List   Diagnosis Code    Essential hypertension, benign I10    Intractable migraine G43.919    Hyperlipidemia E78.5    DDD (degenerative disc disease), lumbar M51.36       Past Medical History:        Diagnosis Date    Essential hypertension, benign 2011    GERD (gastroesophageal reflux disease) 2016    Hyperlipidemia 2011    Migraine     Osteoarthritis of right knee 2011    Pyriformis syndrome 2012    Screening mammogram for high-risk patient *2019    Benign    Screening mammogram, encounter for *May 26, 2020       Past Surgical History:        Procedure Laterality Date    COLONOSCOPY  *May 13, 2016 (  )    Dr. Phil Gutierrez - diverticulosis, polyp ( colonoc mucosa )    ECTOPIC PREGNANCY SURGERY      ENDOMETRIAL ABLATION      HYSTERECTOMY      LUMBAR FUSION      Dr. Casey Jori  *May, 2016    Dr. Phil Gutierrez - erosive esophagitis    [place] Never  *2019    Dr. Phil Gutierrez - gastric nodule ( biopsy benign )     BREAST FINE NEEDLE ASPIRATION         Social History:    Social History     Tobacco Use    Smoking status: Former Smoker     Packs/day: 0.50     Years: 13.00     Pack years: 6.50     Quit date: 2000     Years since quittin.6    Smokeless tobacco: Never Used   Substance Use Topics    Alcohol use: Not Currently     Alcohol/week: 0.0 standard drinks                                Counseling given: Not Answered      Vital Signs (Current):   Vitals:    21 0917   BP: 138/84   Pulse: 87   Resp: 16   Temp: 97.1 °F (36.2 °C)   TempSrc: Temporal   SpO2: 96%   Weight: 198 lb (89.8 kg)   Height: 5' 4\" (1.626 m)                                              BP Readings from Last 3 Encounters:   21 138/84   10/06/20 126/80   20 124/76       NPO Status: Time of last liquid consumption:                         Time of last solid consumption:                         Date of last liquid consumption: 21                        Date of last solid food consumption: 21    BMI:   Wt Readings from Last 3 Encounters:   08/31/21 198 lb (89.8 kg)   07/12/21 214 lb (97.1 kg)   10/06/20 222 lb (100.7 kg)     Body mass index is 33.99 kg/m². CBC:   Lab Results   Component Value Date    WBC 5.9 07/12/2021    RBC 4.42 07/12/2021    HGB 12.4 07/12/2021    HCT 37.2 07/12/2021    MCV 84.3 07/12/2021    RDW 14.0 07/12/2021     07/12/2021       CMP:   Lab Results   Component Value Date     08/19/2020    K 4.6 08/19/2020    K 3.8 05/12/2019     08/19/2020    CO2 22 08/19/2020    BUN 20 08/19/2020    CREATININE 0.7 08/19/2020    GFRAA >60 08/19/2020    GFRAA >60 12/31/2012    AGRATIO 1.3 08/19/2020    LABGLOM >60 08/19/2020    GLUCOSE 117 08/19/2020    PROT 7.0 08/19/2020    PROT 7.6 12/31/2012    CALCIUM 8.7 08/19/2020    BILITOT <0.2 08/19/2020    ALKPHOS 47 08/19/2020    AST 15 08/19/2020    ALT 15 08/19/2020       POC Tests: No results for input(s): POCGLU, POCNA, POCK, POCCL, POCBUN, POCHEMO, POCHCT in the last 72 hours.     Coags: No results found for: PROTIME, INR, APTT    HCG (If Applicable): No results found for: PREGTESTUR, PREGSERUM, HCG, HCGQUANT     ABGs: No results found for: PHART, PO2ART, YBM2EBP, MBE9JJQ, BEART, G6BDVIGF     Type & Screen (If Applicable):  No results found for: LABABO, LABRH    Drug/Infectious Status (If Applicable):  No results found for: HIV, HEPCAB    COVID-19 Screening (If Applicable):   Lab Results   Component Value Date    COVID19 NOT DETECTED 12/07/2020           Anesthesia Evaluation  Patient summary reviewed and Nursing notes reviewed no history of anesthetic complications:   Airway: Mallampati: I  TM distance: <3 FB   Neck ROM: full  Mouth opening: > = 3 FB Dental:    (+) upper dentures and lower dentures      Pulmonary:Negative Pulmonary ROS and normal exam                               Cardiovascular:  Exercise tolerance: good (>4 METS),   (+) hypertension:,       NYHA Classification: I    Rhythm: regular  Rate: normal                    Neuro/Psych:   Negative Neuro/Psych ROS              GI/Hepatic/Renal:   (+) GERD: well controlled,           Endo/Other: Negative Endo/Other ROS                    Abdominal:             Vascular: negative vascular ROS. Other Findings:             Anesthesia Plan      MAC     ASA 2       Induction: intravenous. Anesthetic plan and risks discussed with patient. Plan discussed with CRNA.     Attending anesthesiologist reviewed and agrees with Preprocedure content              Simon Beckman DO   8/31/2021

## 2021-09-09 ENCOUNTER — OFFICE VISIT (OUTPATIENT)
Dept: FAMILY MEDICINE CLINIC | Age: 61
End: 2021-09-09
Payer: COMMERCIAL

## 2021-09-09 VITALS
OXYGEN SATURATION: 97 % | DIASTOLIC BLOOD PRESSURE: 86 MMHG | SYSTOLIC BLOOD PRESSURE: 110 MMHG | HEIGHT: 64 IN | BODY MASS INDEX: 34.25 KG/M2 | TEMPERATURE: 97.3 F | HEART RATE: 81 BPM | WEIGHT: 200.6 LBS

## 2021-09-09 DIAGNOSIS — E78.5 HYPERLIPIDEMIA, UNSPECIFIED HYPERLIPIDEMIA TYPE: ICD-10-CM

## 2021-09-09 DIAGNOSIS — Z23 NEED FOR INFLUENZA VACCINATION: ICD-10-CM

## 2021-09-09 DIAGNOSIS — I10 ESSENTIAL HYPERTENSION, BENIGN: ICD-10-CM

## 2021-09-09 DIAGNOSIS — R73.09 ELEVATED GLUCOSE: ICD-10-CM

## 2021-09-09 DIAGNOSIS — Z00.00 ENCOUNTER FOR ANNUAL PHYSICAL EXAM: Primary | ICD-10-CM

## 2021-09-09 DIAGNOSIS — G43.711 INTRACTABLE CHRONIC MIGRAINE WITHOUT AURA AND WITH STATUS MIGRAINOSUS: ICD-10-CM

## 2021-09-09 PROCEDURE — 90674 CCIIV4 VAC NO PRSV 0.5 ML IM: CPT | Performed by: NURSE PRACTITIONER

## 2021-09-09 PROCEDURE — 90471 IMMUNIZATION ADMIN: CPT | Performed by: NURSE PRACTITIONER

## 2021-09-09 PROCEDURE — 99396 PREV VISIT EST AGE 40-64: CPT | Performed by: NURSE PRACTITIONER

## 2021-09-09 RX ORDER — ATORVASTATIN CALCIUM 10 MG/1
10 TABLET, FILM COATED ORAL DAILY
Qty: 30 TABLET | Refills: 3 | Status: SHIPPED | OUTPATIENT
Start: 2021-09-09 | End: 2022-07-27

## 2021-09-09 RX ORDER — ATORVASTATIN CALCIUM 10 MG/1
10 TABLET, FILM COATED ORAL DAILY
Qty: 30 TABLET | Refills: 3 | Status: SHIPPED | OUTPATIENT
Start: 2021-09-09 | End: 2021-09-09

## 2021-09-09 RX ORDER — ATORVASTATIN CALCIUM 10 MG/1
10 TABLET, FILM COATED ORAL DAILY
Qty: 90 TABLET | Refills: 1 | Status: SHIPPED | OUTPATIENT
Start: 2021-09-09 | End: 2022-07-27

## 2021-09-09 ASSESSMENT — ENCOUNTER SYMPTOMS
ABDOMINAL DISTENTION: 0
COUGH: 0
EYE PAIN: 0
EYE DISCHARGE: 0
EYE ITCHING: 0
EYE REDNESS: 0
ABDOMINAL PAIN: 0
STRIDOR: 0
WHEEZING: 0
SHORTNESS OF BREATH: 0
BACK PAIN: 0
SINUS PRESSURE: 0
SINUS PAIN: 0
DIARRHEA: 0
CONSTIPATION: 0
RHINORRHEA: 0
TROUBLE SWALLOWING: 0
VOMITING: 0
CHEST TIGHTNESS: 0
NAUSEA: 0

## 2021-09-09 ASSESSMENT — PATIENT HEALTH QUESTIONNAIRE - PHQ9
SUM OF ALL RESPONSES TO PHQ QUESTIONS 1-9: 0
2. FEELING DOWN, DEPRESSED OR HOPELESS: 0
2. FEELING DOWN, DEPRESSED OR HOPELESS: 0
SUM OF ALL RESPONSES TO PHQ QUESTIONS 1-9: 0
SUM OF ALL RESPONSES TO PHQ QUESTIONS 1-9: 0
1. LITTLE INTEREST OR PLEASURE IN DOING THINGS: 0
SUM OF ALL RESPONSES TO PHQ QUESTIONS 1-9: 0
SUM OF ALL RESPONSES TO PHQ QUESTIONS 1-9: 0
SUM OF ALL RESPONSES TO PHQ9 QUESTIONS 1 & 2: 0
SUM OF ALL RESPONSES TO PHQ QUESTIONS 1-9: 0

## 2021-09-09 NOTE — PROGRESS NOTES
Subjective:      Patient ID: Aleisha Self is a 62 y.o. y.o. female. HPI    Aleisha Self is here for a physical.  Migraines much better controlled. Not missing work any longer. Feels better. Seeing weight mgmt MD.  Discussed HRT. Pt has h/o polycystic dense breasts. No family/person breast Ca hx. Rec working on increasing targeted exercise and proper diet vs HRT for weight mgmt.   Decreasing risk factors as able    Chief Complaint   Patient presents with    Annual Exam       Vitals:    09/09/21 1303   BP: 110/86   Pulse: 81   Temp: 97.3 °F (36.3 °C)   SpO2: 97%       Wt Readings from Last 3 Encounters:   09/09/21 200 lb 9.6 oz (91 kg)   08/31/21 198 lb (89.8 kg)   07/12/21 214 lb (97.1 kg)       Past Medical History:   Diagnosis Date    Essential hypertension, benign 7/5/2011    GERD (gastroesophageal reflux disease) 4/22/2016    Hyperlipidemia 7/7/2011    Migraine     Osteoarthritis of right knee 7/7/2011    Pyriformis syndrome 12/31/2012    Screening mammogram for high-risk patient *June 11, 2019    Benign    Screening mammogram, encounter for *May 26, 2020       Past Surgical History:   Procedure Laterality Date    COLONOSCOPY  *May 13, 2016 ( 2026 )    Dr. Sara Nazario - diverticulosis, polyp ( colonoc mucosa )    ECTOPIC PREGNANCY SURGERY      ENDOMETRIAL ABLATION      HYSTERECTOMY      LUMBAR FUSION  2014    Dr. Yanick Brennan  *May, 2016    Dr. Sara Nazario - erosive esophagitis    [address] [place] [address] Drive  *Sept.24, 2019    Dr. Sara Nazario - gastric nodule ( biopsy benign )    UPPER GASTROINTESTINAL ENDOSCOPY N/A 8/31/2021    EGD ESOPHAGOGASTRODUODENOSCOPY ULTRASOUND performed by Elier Vincent MD at [address] 8/31/2021    EGD W/ EMR performed by Elier Vincent MD at [address]         Current Outpatient Medications   Medication Sig Dispense Refill    Rimegepant Sulfate 75 MG TBDP Take 75 mg by mouth every other day 14 tablet 3    atorvastatin (LIPITOR) 10 MG tablet Take 1 tablet by mouth daily 30 tablet 3    omeprazole (PRILOSEC) 20 MG delayed release capsule Take 2 capsules by mouth 2 times daily 240 capsule 0    butalbital-APAP-caffeine -40 MG CAPS per capsule TAKE 1 CAPSULE BY MOUTH EVERY 4 HOURS AS NEEDED FOR HEADACHE 25 capsule 0    naproxen (NAPROSYN) 500 MG tablet TAKE 1 TABLET BY MOUTH TWICE DAILY ON A FULL STOMACH AS NEEDED FOR PAIN 30 tablet 12    Erenumab-aooe 70 MG/ML SOAJ Inject 1 pen monthly 3 pen 3    aspirin 81 MG chewable tablet Take 1 tablet by mouth daily 30 tablet 3    atorvastatin (LIPITOR) 10 MG tablet TAKE 1 TABLET BY MOUTH DAILY 90 tablet 1    sucralfate (CARAFATE) 1 GM/10ML suspension Take 10 mLs by mouth 4 times daily for 7 days 280 mL 0     No current facility-administered medications for this visit.        Family History   Problem Relation Age of Onset    Ovarian Cancer Maternal Aunt     Cancer Maternal Grandmother     Cancer Paternal Grandfather     Other Father          - cause unknown     Diabetes Mother 80        Alive - DM, Hypertension, heart disease     Rheum Arthritis Neg Hx     Osteoarthritis Neg Hx     Asthma Neg Hx     Breast Cancer Neg Hx     Heart Failure Neg Hx     High Cholesterol Neg Hx     Hypertension Neg Hx     Migraines Neg Hx     Rashes/Skin Problems Neg Hx     Seizures Neg Hx     Stroke Neg Hx     Thyroid Disease Neg Hx        Social History     Tobacco Use    Smoking status: Former Smoker     Packs/day: 0.50     Years: 13.00     Pack years: 6.50     Quit date: 2000     Years since quittin.7    Smokeless tobacco: Never Used   Vaping Use    Vaping Use: Never used   Substance Use Topics    Alcohol use: Not Currently     Alcohol/week: 0.0 standard drinks    Drug use: No       Immunization History   Administered Date(s) Administered    COVID-19, Pfizer, PF, 30mcg/0.3mL 02/13/2021, 03/06/2021    Influenza Virus Vaccine 10/01/2018, 10/13/2019    Influenza, MDCK Quadv, IM, PF (Flucelvax 2 yrs and older) 09/09/2021    Influenza, Quadv, IM, PF (6 mo and older Fluzone, Flulaval, Fluarix, and 3 yrs and older Afluria) 10/06/2020    Tdap (Boostrix, Adacel) 09/01/2018       Health Maintenance   Topic Date Due    A1C test (Diabetic or Prediabetic)  08/19/2021    Potassium monitoring  08/19/2021    Creatinine monitoring  08/19/2021    Shingles Vaccine (1 of 2) 12/31/2099 (Originally 12/16/2011)    Lipid screen  07/12/2022    Breast cancer screen  07/12/2023    Colon cancer screen colonoscopy  05/13/2026    DTaP/Tdap/Td vaccine (2 - Td or Tdap) 09/01/2028    Flu vaccine  Completed    COVID-19 Vaccine  Completed    Hepatitis C screen  Completed    HIV screen  Completed    Hepatitis A vaccine  Aged Out    Hepatitis B vaccine  Aged Out    Hib vaccine  Aged Out    Meningococcal (ACWY) vaccine  Aged Out    Pneumococcal 0-64 years Vaccine  Aged Out         Review of Systems   Constitutional: Negative for chills, fatigue and fever. HENT: Negative for congestion, ear pain, hearing loss, rhinorrhea, sinus pressure, sinus pain, tinnitus and trouble swallowing. Eyes: Negative for pain, discharge, redness and itching. Respiratory: Negative for cough, chest tightness, shortness of breath, wheezing and stridor. Cardiovascular: Negative for chest pain, palpitations and leg swelling. Gastrointestinal: Negative for abdominal distention, abdominal pain, constipation, diarrhea, nausea and vomiting. Genitourinary: Negative for difficulty urinating, dysuria, hematuria and urgency. Musculoskeletal: Negative for back pain, joint swelling, myalgias and neck pain. Skin: Negative for rash and wound. Neurological: Negative for dizziness and headaches. Objective:   Physical Exam  Vitals reviewed. Constitutional:       Appearance: She is well-developed.  She is obese.   HENT:      Head: Normocephalic and atraumatic. Right Ear: External ear normal.      Left Ear: External ear normal.      Nose: Nose normal.   Eyes:      General: No scleral icterus. Right eye: No discharge. Left eye: No discharge. Conjunctiva/sclera: Conjunctivae normal.      Pupils: Pupils are equal, round, and reactive to light. Neck:      Thyroid: No thyromegaly. Cardiovascular:      Rate and Rhythm: Normal rate and regular rhythm. Heart sounds: Normal heart sounds. No murmur heard. No friction rub. No gallop. Pulmonary:      Effort: Pulmonary effort is normal. No respiratory distress. Breath sounds: Normal breath sounds. No stridor. No wheezing or rales. Chest:      Chest wall: No tenderness. Abdominal:      General: Bowel sounds are normal. There is no distension. Palpations: Abdomen is soft. There is no mass. Tenderness: There is no abdominal tenderness. There is no guarding or rebound. Hernia: No hernia is present. Musculoskeletal:         General: No tenderness or deformity. Normal range of motion. Cervical back: Normal range of motion and neck supple. Lymphadenopathy:      Cervical: No cervical adenopathy. Skin:     General: Skin is warm and dry. Capillary Refill: Capillary refill takes less than 2 seconds. Coloration: Skin is not pale. Findings: No erythema or rash. Neurological:      Mental Status: She is alert and oriented to person, place, and time. Cranial Nerves: No cranial nerve deficit. Motor: No abnormal muscle tone. Coordination: Coordination normal.   Psychiatric:         Behavior: Behavior normal.         Thought Content: Thought content normal.         Judgment: Judgment normal.         Assessment:      See ProblemList assessment and plan      Plan:      Diagnosis Orders   1. Encounter for annual physical exam     2.  Intractable chronic migraine without aura and with status migrainosus  Rimegepant Sulfate 75 MG TBDP   3. Elevated glucose  HEMOGLOBIN A1C    COMPREHENSIVE METABOLIC PANEL   4. Need for influenza vaccination  INFLUENZA, MDCK QUADV, 2 YRS AND OLDER, IM, PF, PREFILL SYR OR SDV, 0.5ML (FLUCELVAX QUADV, PF)   5. Hyperlipidemia, unspecified hyperlipidemia type  atorvastatin (LIPITOR) 10 MG tablet   6. Essential hypertension, benign       Intractable migraine  - Aimovig  - Nurtec  - Down to 8 h/a a month    Hyperlipidemia  - start Lipitor  - Recheck 3 months    Essential hypertension, benign  - NO meds  -Stable with weight and diet    Elevated glucose  - A1C today    Discussed over the counter medication with patient. Deborah received counseling on the following healthy behaviors: exercise    Discussed use, benefit, and side effects of prescribed medications. Barriers to medication compliance addressed. Allpatient questions answered. Pt voiced understanding. Medications reviewed and patient understands. Questions answered  Patient engaged in shared decision making. Information given to evaluate options of treatment, understand what is needed and discuss importance of following plan.

## 2021-10-09 PROBLEM — Z23 NEED FOR INFLUENZA VACCINATION: Status: RESOLVED | Noted: 2021-09-09 | Resolved: 2021-10-09

## 2021-10-09 PROBLEM — Z00.00 ENCOUNTER FOR ANNUAL PHYSICAL EXAM: Status: RESOLVED | Noted: 2021-09-09 | Resolved: 2021-10-09

## 2021-10-19 RX ORDER — BUTALBITAL, ACETAMINOPHEN AND CAFFEINE 300; 40; 50 MG/1; MG/1; MG/1
CAPSULE ORAL
Qty: 25 CAPSULE | Refills: 0 | Status: SHIPPED | OUTPATIENT
Start: 2021-10-19 | End: 2021-12-22 | Stop reason: SDUPTHER

## 2021-10-28 DIAGNOSIS — G43.719 INTRACTABLE CHRONIC MIGRAINE WITHOUT AURA AND WITHOUT STATUS MIGRAINOSUS: ICD-10-CM

## 2021-10-28 RX ORDER — ERENUMAB-AOOE 70 MG/ML
INJECTION SUBCUTANEOUS
Qty: 3 ML | Refills: 5 | Status: SHIPPED | OUTPATIENT
Start: 2021-10-28 | End: 2021-12-23 | Stop reason: SDUPTHER

## 2021-12-22 ENCOUNTER — PATIENT MESSAGE (OUTPATIENT)
Dept: FAMILY MEDICINE CLINIC | Age: 61
End: 2021-12-22

## 2021-12-22 DIAGNOSIS — G43.719 INTRACTABLE CHRONIC MIGRAINE WITHOUT AURA AND WITHOUT STATUS MIGRAINOSUS: ICD-10-CM

## 2021-12-22 RX ORDER — BUTALBITAL, ACETAMINOPHEN AND CAFFEINE 300; 40; 50 MG/1; MG/1; MG/1
CAPSULE ORAL
Qty: 25 CAPSULE | Refills: 0 | Status: SHIPPED | OUTPATIENT
Start: 2021-12-22 | End: 2022-02-07 | Stop reason: SDUPTHER

## 2021-12-23 RX ORDER — ERENUMAB-AOOE 70 MG/ML
INJECTION SUBCUTANEOUS
Qty: 3 ML | Refills: 12 | Status: SHIPPED | OUTPATIENT
Start: 2021-12-23 | End: 2022-07-27 | Stop reason: SDUPTHER

## 2021-12-23 NOTE — TELEPHONE ENCOUNTER
From: Deborah Maurer  To: Nick Oliveira  Sent: 12/22/2021 6:21 PM EST  Subject: Amovig Refill    Hi Lazara, the Amovig injection needs refill and prior authorization. I wasn't aware I was out of refills, but that is what Jose has in their records. .Thank you.

## 2022-01-11 ENCOUNTER — HOSPITAL ENCOUNTER (OUTPATIENT)
Dept: ULTRASOUND IMAGING | Age: 62
Discharge: HOME | End: 2022-01-11
Payer: COMMERCIAL

## 2022-01-11 DIAGNOSIS — M51.36 DDD (DEGENERATIVE DISC DISEASE), LUMBAR: Primary | ICD-10-CM

## 2022-01-11 DIAGNOSIS — R93.89 ABNORMAL ULTRASOUND: ICD-10-CM

## 2022-01-11 PROCEDURE — 76642 ULTRASOUND BREAST LIMITED: CPT

## 2022-01-12 DIAGNOSIS — M51.36 DDD (DEGENERATIVE DISC DISEASE), LUMBAR: Primary | ICD-10-CM

## 2022-01-20 DIAGNOSIS — R13.10 DYSPHAGIA, UNSPECIFIED TYPE: ICD-10-CM

## 2022-01-20 DIAGNOSIS — R12 HEART BURN: ICD-10-CM

## 2022-01-24 RX ORDER — OMEPRAZOLE 20 MG/1
CAPSULE, DELAYED RELEASE ORAL
Qty: 180 CAPSULE | Refills: 2 | Status: SHIPPED | OUTPATIENT
Start: 2022-01-24 | End: 2022-08-19

## 2022-02-07 RX ORDER — BUTALBITAL, ACETAMINOPHEN AND CAFFEINE 300; 40; 50 MG/1; MG/1; MG/1
CAPSULE ORAL
Qty: 25 CAPSULE | Refills: 0 | Status: SHIPPED | OUTPATIENT
Start: 2022-02-07 | End: 2022-03-16

## 2022-03-16 RX ORDER — BUTALBITAL, ACETAMINOPHEN AND CAFFEINE 300; 40; 50 MG/1; MG/1; MG/1
CAPSULE ORAL
Qty: 25 CAPSULE | Refills: 0 | Status: SHIPPED | OUTPATIENT
Start: 2022-03-16 | End: 2022-04-18

## 2022-04-15 ENCOUNTER — PATIENT MESSAGE (OUTPATIENT)
Dept: FAMILY MEDICINE CLINIC | Age: 62
End: 2022-04-15

## 2022-04-15 DIAGNOSIS — G43.719 INTRACTABLE CHRONIC MIGRAINE WITHOUT AURA AND WITHOUT STATUS MIGRAINOSUS: Primary | ICD-10-CM

## 2022-04-15 NOTE — TELEPHONE ENCOUNTER
From: Deborah Maurer  To: Sean Kay  Sent: 4/15/2022 12:12 PM EDT  Subject: Referral - Neurologist     Mariel Cervantes,     Could I get a referral for Neurology -  [facility] [place] [phone]? I am having blinding headaches and this constant ringing in the ears. I have been taking my blood pressure and it has been ok. I am starting to worry since the headaches are not getting better even with the meds. I am still having 8-10 migraines a month. They are just not lasting as long, and are not as intense, but I am still getting them and the constant ringing is horrible. Maybe something else is going on, that I should see Neuro Doc for. Thank you.

## 2022-04-15 NOTE — TELEPHONE ENCOUNTER
It is not allowing me to put the external referral into that provider. Do they have a referral sheet that they can send and I can sign?

## 2022-04-18 DIAGNOSIS — G43.719 INTRACTABLE CHRONIC MIGRAINE WITHOUT AURA AND WITHOUT STATUS MIGRAINOSUS: Primary | ICD-10-CM

## 2022-04-18 RX ORDER — BUTALBITAL, ACETAMINOPHEN AND CAFFEINE 300; 40; 50 MG/1; MG/1; MG/1
CAPSULE ORAL
Qty: 25 CAPSULE | Refills: 0 | Status: SHIPPED | OUTPATIENT
Start: 2022-04-18 | End: 2022-05-16

## 2022-05-12 DIAGNOSIS — G43.719 INTRACTABLE CHRONIC MIGRAINE WITHOUT AURA AND WITHOUT STATUS MIGRAINOSUS: Primary | ICD-10-CM

## 2022-05-16 RX ORDER — BUTALBITAL, ACETAMINOPHEN AND CAFFEINE 300; 40; 50 MG/1; MG/1; MG/1
CAPSULE ORAL
Qty: 25 CAPSULE | Refills: 0 | Status: SHIPPED | OUTPATIENT
Start: 2022-05-16 | End: 2022-06-22 | Stop reason: SDUPTHER

## 2022-06-21 ENCOUNTER — TELEPHONE (OUTPATIENT)
Dept: ORTHOPEDIC SURGERY | Age: 62
End: 2022-06-21

## 2022-06-21 NOTE — TELEPHONE ENCOUNTER
Submitted PA for Aimovig 70MG/ML auto-injectors  Via CMM Key: [place] sent additional questions that were answered and faxed to them. STATUS: APPROVED 06/24/22 to 06/23/23; Approval letter attached. If this requires a response please respond to the pool ( [facility] [address] [facility]). Thank you please advise patient.

## 2022-06-22 RX ORDER — BUTALBITAL, ACETAMINOPHEN AND CAFFEINE 300; 40; 50 MG/1; MG/1; MG/1
CAPSULE ORAL
Qty: 25 CAPSULE | Refills: 0 | OUTPATIENT
Start: 2022-06-22

## 2022-06-22 RX ORDER — BUTALBITAL, ACETAMINOPHEN AND CAFFEINE 300; 40; 50 MG/1; MG/1; MG/1
1 CAPSULE ORAL EVERY 6 HOURS PRN
Qty: 25 CAPSULE | Refills: 0 | Status: SHIPPED | OUTPATIENT
Start: 2022-06-22 | End: 2022-07-27 | Stop reason: SDUPTHER

## 2022-07-14 ENCOUNTER — TELEPHONE (OUTPATIENT)
Dept: FAMILY MEDICINE CLINIC | Age: 62
End: 2022-07-14

## 2022-07-19 NOTE — TELEPHONE ENCOUNTER
Pt states she was taking Nurtec but I did inform her that her insurance does not approve that medication. Pt is willing to try Jose Perla as she did Jasmyn Kauffman but that did not work.

## 2022-07-20 RX ORDER — ATOGEPANT 30 MG/1
30 TABLET ORAL DAILY
Qty: 90 TABLET | Refills: 3 | Status: SHIPPED | OUTPATIENT
Start: 2022-07-20

## 2022-07-25 ENCOUNTER — TELEPHONE (OUTPATIENT)
Dept: ORTHOPEDIC SURGERY | Age: 62
End: 2022-07-25

## 2022-07-25 NOTE — TELEPHONE ENCOUNTER
Submitted PA for Qulipta 30MG tablets  Via CMM Key: [id number] STATUS: DENIED; Denial letter attached. If this requires a response please respond to the pool ( [facility] [address]). Thank you please advise patient.

## 2022-07-26 ENCOUNTER — PATIENT MESSAGE (OUTPATIENT)
Dept: FAMILY MEDICINE CLINIC | Age: 62
End: 2022-07-26

## 2022-07-26 ENCOUNTER — HOSPITAL ENCOUNTER (OUTPATIENT)
Dept: MAMMOGRAPHY | Age: 62
Discharge: HOME | End: 2022-07-26
Payer: COMMERCIAL

## 2022-07-26 VITALS — BODY MASS INDEX: 36.54 KG/M2 | HEIGHT: 64 IN | WEIGHT: 214 LBS

## 2022-07-26 DIAGNOSIS — Z12.31 VISIT FOR SCREENING MAMMOGRAM: ICD-10-CM

## 2022-07-26 PROCEDURE — 77063 BREAST TOMOSYNTHESIS BI: CPT

## 2022-07-26 NOTE — TELEPHONE ENCOUNTER
From: Deborah Maurer  To: Kusum Lopez  Sent: 7/26/2022 2:49 PM EDT  Subject: New Medication - Marinus Quails,    I have been taking the medicine for a week. I woke yesterday with a full blown migraine. How long should it take before I see results? Is there anything I can take with it? I am now in full blown Migraine.

## 2022-07-27 DIAGNOSIS — G43.719 INTRACTABLE CHRONIC MIGRAINE WITHOUT AURA AND WITHOUT STATUS MIGRAINOSUS: ICD-10-CM

## 2022-07-27 RX ORDER — BUTALBITAL, ACETAMINOPHEN AND CAFFEINE 300; 40; 50 MG/1; MG/1; MG/1
1 CAPSULE ORAL EVERY 6 HOURS PRN
Qty: 25 CAPSULE | Refills: 0 | Status: SHIPPED | OUTPATIENT
Start: 2022-07-27 | End: 2022-08-30 | Stop reason: SDUPTHER

## 2022-07-27 RX ORDER — ERENUMAB-AOOE 70 MG/ML
INJECTION SUBCUTANEOUS
Qty: 3 ML | Refills: 12 | Status: SHIPPED | OUTPATIENT
Start: 2022-07-27

## 2022-08-01 ENCOUNTER — OFFICE VISIT (OUTPATIENT)
Dept: FAMILY MEDICINE CLINIC | Age: 62
End: 2022-08-01
Payer: COMMERCIAL

## 2022-08-01 VITALS
SYSTOLIC BLOOD PRESSURE: 124 MMHG | HEART RATE: 75 BPM | OXYGEN SATURATION: 100 % | DIASTOLIC BLOOD PRESSURE: 82 MMHG | HEIGHT: 64 IN | BODY MASS INDEX: 31.76 KG/M2 | WEIGHT: 186 LBS | TEMPERATURE: 97.3 F

## 2022-08-01 DIAGNOSIS — Z00.00 ROUTINE GENERAL MEDICAL EXAMINATION AT A HEALTH CARE FACILITY: Primary | ICD-10-CM

## 2022-08-01 DIAGNOSIS — I10 ESSENTIAL HYPERTENSION, BENIGN: ICD-10-CM

## 2022-08-01 DIAGNOSIS — Z00.00 ENCOUNTER FOR WELL ADULT EXAM WITHOUT ABNORMAL FINDINGS: ICD-10-CM

## 2022-08-01 DIAGNOSIS — G43.711 INTRACTABLE CHRONIC MIGRAINE WITHOUT AURA AND WITH STATUS MIGRAINOSUS: ICD-10-CM

## 2022-08-01 PROCEDURE — 99396 PREV VISIT EST AGE 40-64: CPT | Performed by: NURSE PRACTITIONER

## 2022-08-01 ASSESSMENT — PATIENT HEALTH QUESTIONNAIRE - PHQ9
SUM OF ALL RESPONSES TO PHQ9 QUESTIONS 1 & 2: 0
SUM OF ALL RESPONSES TO PHQ QUESTIONS 1-9: 0
1. LITTLE INTEREST OR PLEASURE IN DOING THINGS: 0
SUM OF ALL RESPONSES TO PHQ QUESTIONS 1-9: 0
2. FEELING DOWN, DEPRESSED OR HOPELESS: 0

## 2022-08-01 ASSESSMENT — ENCOUNTER SYMPTOMS
PHOTOPHOBIA: 0
EYE ITCHING: 0
COUGH: 0
BACK PAIN: 0
SHORTNESS OF BREATH: 0
CONSTIPATION: 0
EYE PAIN: 0
WHEEZING: 0
EYE REDNESS: 0
VOMITING: 0
NAUSEA: 0
RHINORRHEA: 0
STRIDOR: 0
SINUS PRESSURE: 0
CHEST TIGHTNESS: 0
BLOOD IN STOOL: 0
COLOR CHANGE: 0
ABDOMINAL PAIN: 0
SINUS PAIN: 0
EYE DISCHARGE: 0
DIARRHEA: 0
SORE THROAT: 0

## 2022-08-01 NOTE — PROGRESS NOTES
Subjective:     CC:  Annual Exam      HPI:  Tomas Velazquez is here for a comprehensive physical exam.  She has been doing well overall. She states that her migraines are much improved. She has been doing Aimovig and Flakito Karena. She is getting ready to have back surgery in September she will be back in the office for a preop at that time. She has no concerns at all.     Vitals:    22 1115   BP: 124/82   Pulse: 75   Temp: 97.3 °F (36.3 °C)   SpO2: 100%       Wt Readings from Last 3 Encounters:   22 186 lb (84.4 kg)   22 214 lb (97.1 kg)   21 200 lb 9.6 oz (91 kg)       Past Medical History:   Diagnosis Date    Essential hypertension, benign 2011    GERD (gastroesophageal reflux disease) 2016    Hyperlipidemia 2011    Migraine     Osteoarthritis of right knee 2011    Pyriformis syndrome 2012    Screening mammogram for high-risk patient *2019    Benign    Screening mammogram, encounter for *May 26, 2020       Past Surgical History:   Procedure Laterality Date    COLONOSCOPY  *May 13, 2016 (  )    Dr. Madalyn Waters - diverticulosis, polyp ( colonoc mucosa )    ECTOPIC PREGNANCY SURGERY      ENDOMETRIAL ABLATION      HYSTERECTOMY (CERVIX STATUS UNKNOWN)      Diane Quintana      Dr. Kymberly Ceron  *May, 2016    Dr. Madalyn Waters - erosive esophagitis    UPPER GASTROINTESTINAL ENDOSCOPY  *2019    Dr. Madalyn Waters - gastric nodule ( biopsy benign )    UPPER GASTROINTESTINAL ENDOSCOPY N/A 2021    EGD ESOPHAGOGASTRODUODENOSCOPY ULTRASOUND [facility] [address] [facility] N/A 2021    EGD W/ EMR performed by Macie Reyes MD at [address] [facility]          Family History   Problem Relation Age of Onset    Ovarian Cancer Maternal Aunt     Cancer Maternal Grandmother     Cancer Paternal Grandfather     Other Father          - cause unknown     Diabetes Mother 80        Alive - DM, Hypertension, heart disease     Rheum Arthritis Neg Hx     Osteoarthritis Neg Hx     Asthma Neg Hx     Breast Cancer Neg Hx     Heart Failure Neg Hx     High Cholesterol Neg Hx     Hypertension Neg Hx     Migraines Neg Hx     Rashes/Skin Problems Neg Hx     Seizures Neg Hx     Stroke Neg Hx     Thyroid Disease Neg Hx        Social History     Tobacco Use    Smoking status: Former     Packs/day: 0.50     Years: 13.00     Pack years: 6.50     Types: Cigarettes     Quit date: 2000     Years since quittin.5    Smokeless tobacco: Never   Vaping Use    Vaping Use: Never used   Substance Use Topics    Alcohol use: Not Currently     Alcohol/week: 0.0 standard drinks    Drug use: No       Immunization History   Administered Date(s) Administered    COVID-19, PFIZER PURPLE top, DILUTE for use, (age 15 y+), 30mcg/0.3mL 2021, 2021, 2021    Influenza Virus Vaccine 10/01/2018, 10/13/2019    Influenza, MDCK Quadv, IM, PF (Flucelvax 2 yrs and older) 2021    Influenza, Quadv, IM, PF (6 mo and older Fluzone, Flulaval, Fluarix, and 3 yrs and older Afluria) 10/06/2020    Tdap (Boostrix, Adacel) 2018       Health Maintenance   Topic Date Due    COVID-19 Vaccine (4 - Booster for Pfizer series) 2022    Shingles vaccine (1 of 2) 2099 (Originally 2011)    Flu vaccine (1) 2022    A1C test (Diabetic or Prediabetic)  2022    Depression Screen  2022    Breast cancer screen  2024    Colorectal Cancer Screen  2026    Lipids  2026    DTaP/Tdap/Td vaccine (2 - Td or Tdap) 2028    Hepatitis C screen  Completed    HIV screen  Completed    Hepatitis A vaccine  Aged Out    Hepatitis B vaccine  Aged Out    Hib vaccine  Aged Out    Meningococcal (ACWY) vaccine  Aged Out    Pneumococcal 0-64 years Vaccine  Aged Out       Review of Systems   Constitutional:  Negative for chills, diaphoresis, fatigue and fever.   HENT:  Negative for congestion, ear discharge, ear pain, hearing loss, nosebleeds, postnasal drip, rhinorrhea, sinus pressure, sinus pain, sneezing, sore throat and tinnitus. Eyes:  Negative for photophobia, pain, discharge, redness and itching. Respiratory:  Negative for cough, chest tightness, shortness of breath, wheezing and stridor. Cardiovascular:  Negative for chest pain, palpitations and leg swelling. Gastrointestinal:  Negative for abdominal pain, blood in stool, constipation, diarrhea, nausea and vomiting. Endocrine: Negative for cold intolerance, heat intolerance and polydipsia. Genitourinary:  Negative for difficulty urinating, dysuria, flank pain, frequency, hematuria and urgency. Musculoskeletal:  Negative for arthralgias, back pain, joint swelling, myalgias and neck pain. Skin:  Negative for color change, pallor, rash and wound. Allergic/Immunologic: Negative for environmental allergies and food allergies. Neurological:  Negative for dizziness, tremors, seizures, syncope, weakness, light-headedness, numbness and headaches. Hematological:  Negative for adenopathy. Does not bruise/bleed easily. Psychiatric/Behavioral:  Negative for confusion, hallucinations, sleep disturbance and suicidal ideas. The patient is not nervous/anxious and is not hyperactive. Objective:     Physical Exam  Constitutional:       Appearance: Normal appearance. She is normal weight. HENT:      Head: Normocephalic and atraumatic. Right Ear: Tympanic membrane, ear canal and external ear normal.      Left Ear: Tympanic membrane, ear canal and external ear normal.      Nose: Nose normal.      Mouth/Throat:      Mouth: Mucous membranes are moist.   Eyes:      Extraocular Movements: Extraocular movements intact. Conjunctiva/sclera: Conjunctivae normal.      Pupils: Pupils are equal, round, and reactive to light. Cardiovascular:      Rate and Rhythm: Normal rate and regular rhythm. questions answered. Patient voiced understanding. Medications reviewed and patient understands.   Questions answered

## 2022-08-03 DIAGNOSIS — Z00.00 ROUTINE GENERAL MEDICAL EXAMINATION AT A HEALTH CARE FACILITY: ICD-10-CM

## 2022-08-03 LAB
A/G RATIO: 1.4 (ref 1.1–2.2)
ALBUMIN SERPL-MCNC: 4.1 G/DL (ref 3.4–5)
ALP BLD-CCNC: 51 U/L (ref 40–129)
ALT SERPL-CCNC: 15 U/L (ref 10–40)
ANION GAP SERPL CALCULATED.3IONS-SCNC: 11 MMOL/L (ref 3–16)
AST SERPL-CCNC: 14 U/L (ref 15–37)
BILIRUB SERPL-MCNC: 0.3 MG/DL (ref 0–1)
BUN BLDV-MCNC: 13 MG/DL (ref 7–20)
CALCIUM SERPL-MCNC: 9 MG/DL (ref 8.3–10.6)
CHLORIDE BLD-SCNC: 106 MMOL/L (ref 99–110)
CHOLESTEROL, FASTING: 226 MG/DL (ref 0–199)
CO2: 25 MMOL/L (ref 21–32)
CREAT SERPL-MCNC: 0.6 MG/DL (ref 0.6–1.2)
GFR AFRICAN AMERICAN: >60
GFR NON-AFRICAN AMERICAN: >60
GLUCOSE BLD-MCNC: 86 MG/DL (ref 70–99)
HCT VFR BLD CALC: 38.5 % (ref 36–48)
HDLC SERPL-MCNC: 57 MG/DL (ref 40–60)
HEMOGLOBIN: 12.8 G/DL (ref 12–16)
LDL CHOLESTEROL CALCULATED: 145 MG/DL
MCH RBC QN AUTO: 28.6 PG (ref 26–34)
MCHC RBC AUTO-ENTMCNC: 33.2 G/DL (ref 31–36)
MCV RBC AUTO: 86.1 FL (ref 80–100)
PDW BLD-RTO: 14.2 % (ref 12.4–15.4)
PLATELET # BLD: 180 K/UL (ref 135–450)
PMV BLD AUTO: 9.5 FL (ref 5–10.5)
POTASSIUM SERPL-SCNC: 4.5 MMOL/L (ref 3.5–5.1)
RBC # BLD: 4.47 M/UL (ref 4–5.2)
SODIUM BLD-SCNC: 142 MMOL/L (ref 136–145)
TOTAL PROTEIN: 7 G/DL (ref 6.4–8.2)
TRIGLYCERIDE, FASTING: 119 MG/DL (ref 0–150)
TSH REFLEX: 1.66 UIU/ML (ref 0.27–4.2)
VITAMIN D 25-HYDROXY: 16.4 NG/ML
VLDLC SERPL CALC-MCNC: 24 MG/DL
WBC # BLD: 5.7 K/UL (ref 4–11)

## 2022-08-04 RX ORDER — NAPROXEN 500 MG/1
TABLET ORAL
Qty: 30 TABLET | Refills: 12 | Status: SHIPPED | OUTPATIENT
Start: 2022-08-04

## 2022-08-08 RX ORDER — ATORVASTATIN CALCIUM 40 MG/1
40 TABLET, FILM COATED ORAL DAILY
Qty: 90 TABLET | OUTPATIENT
Start: 2022-08-08

## 2022-08-08 RX ORDER — ATORVASTATIN CALCIUM 40 MG/1
40 TABLET, FILM COATED ORAL DAILY
Qty: 30 TABLET | Refills: 3 | Status: SHIPPED | OUTPATIENT
Start: 2022-08-08 | End: 2022-09-06

## 2022-08-15 ENCOUNTER — TELEPHONE (OUTPATIENT)
Dept: FAMILY MEDICINE CLINIC | Age: 62
End: 2022-08-15

## 2022-08-17 ENCOUNTER — TELEPHONE (OUTPATIENT)
Dept: FAMILY MEDICINE CLINIC | Age: 62
End: 2022-08-17

## 2022-08-17 NOTE — TELEPHONE ENCOUNTER
PA was denied, so in order for pt to receive [facility] for free with the plan, Tj Liriano needs to appeal the denial.    LOV 08/01/2022

## 2022-08-19 ENCOUNTER — TELEPHONE (OUTPATIENT)
Dept: FAMILY MEDICINE CLINIC | Age: 62
End: 2022-08-19

## 2022-08-19 DIAGNOSIS — R13.10 DYSPHAGIA, UNSPECIFIED TYPE: ICD-10-CM

## 2022-08-19 DIAGNOSIS — R12 HEART BURN: ICD-10-CM

## 2022-08-19 RX ORDER — OMEPRAZOLE 20 MG/1
CAPSULE, DELAYED RELEASE ORAL
Qty: 180 CAPSULE | Refills: 2 | Status: SHIPPED | OUTPATIENT
Start: 2022-08-19

## 2022-08-19 NOTE — TELEPHONE ENCOUNTER
Clau Velez with Patient Service with [facility] called to say that pt's medicine is ready to be filled. Please notify pt that she can contact [facility] at [phone].     LOV 08/01/2022

## 2022-08-22 RX ORDER — ATOGEPANT 30 MG/1
30 TABLET ORAL DAILY
Qty: 90 TABLET | Refills: 3 | Status: CANCELLED | OUTPATIENT
Start: 2022-08-22

## 2022-08-22 NOTE — TELEPHONE ENCOUNTER
Pt called and stated that [facility] didn't have an order. I wasn't sure how to place order for pt and decided to call [facility] and was able to give a verbal order for Atogepant (QULIPTA) 30 MG TABS     Pt is to call and schedule a delivery with [facility]. Called and informed pt that she needs to call number back and schedule a delivery time. Pt verbalized understanding and there were no further questions at this time.

## 2022-08-31 PROBLEM — Z00.00 ROUTINE GENERAL MEDICAL EXAMINATION AT A HEALTH CARE FACILITY: Status: RESOLVED | Noted: 2021-09-09 | Resolved: 2022-08-31

## 2022-09-01 RX ORDER — BUTALBITAL, ACETAMINOPHEN AND CAFFEINE 300; 40; 50 MG/1; MG/1; MG/1
1 CAPSULE ORAL EVERY 6 HOURS PRN
Qty: 25 CAPSULE | Refills: 0 | OUTPATIENT
Start: 2022-09-01

## 2022-09-01 RX ORDER — BUTALBITAL, ACETAMINOPHEN AND CAFFEINE 300; 40; 50 MG/1; MG/1; MG/1
1 CAPSULE ORAL EVERY 6 HOURS PRN
Qty: 25 CAPSULE | Refills: 0 | Status: SHIPPED | OUTPATIENT
Start: 2022-09-01 | End: 2022-10-12 | Stop reason: SDUPTHER

## 2022-09-06 ENCOUNTER — OFFICE VISIT (OUTPATIENT)
Dept: FAMILY MEDICINE CLINIC | Age: 62
End: 2022-09-06
Payer: COMMERCIAL

## 2022-09-06 VITALS
BODY MASS INDEX: 32.27 KG/M2 | HEART RATE: 86 BPM | DIASTOLIC BLOOD PRESSURE: 84 MMHG | OXYGEN SATURATION: 97 % | SYSTOLIC BLOOD PRESSURE: 122 MMHG | WEIGHT: 188 LBS | TEMPERATURE: 97.3 F

## 2022-09-06 DIAGNOSIS — I10 ESSENTIAL HYPERTENSION, BENIGN: ICD-10-CM

## 2022-09-06 DIAGNOSIS — Z01.811 PRE-OP CHEST EXAM: Primary | ICD-10-CM

## 2022-09-06 DIAGNOSIS — G43.711 INTRACTABLE CHRONIC MIGRAINE WITHOUT AURA AND WITH STATUS MIGRAINOSUS: ICD-10-CM

## 2022-09-06 DIAGNOSIS — M51.36 DDD (DEGENERATIVE DISC DISEASE), LUMBAR: ICD-10-CM

## 2022-09-06 PROCEDURE — 93000 ELECTROCARDIOGRAM COMPLETE: CPT | Performed by: NURSE PRACTITIONER

## 2022-09-06 PROCEDURE — 99214 OFFICE O/P EST MOD 30 MIN: CPT | Performed by: NURSE PRACTITIONER

## 2022-09-06 NOTE — PROGRESS NOTES
Requesting surgeon: Jatin Radford  Reason for Consult: Preoperative Evaluation of Risk  Surgery location: [facility]  Surgery date:9/15/22    HPI:   Shama Catalan is a 62 y.o. female with history of DDD. Presents for pre op evaluation for L3-L4 and L4-L5 direct lateral anterior interbody fusion and posterior spinal fusion with possible laminectomy and removal of prior instrumentation. Denies fever, chills, or current illness. Denies personal or family history of anesthesia complications. Medications:  Current Outpatient Medications   Medication Sig Dispense Refill    butalbital-APAP-caffeine -40 MG CAPS per capsule Take 1 capsule by mouth every 6 hours as needed for Headaches or Migraine 25 capsule 0    omeprazole (PRILOSEC) 20 MG delayed release capsule TAKE 1 CAPSULE BY MOUTH TWICE DAILY 180 capsule 2    naproxen (NAPROSYN) 500 MG tablet TAKE 1 TABLET BY MOUTH TWICE DAILY ON A FULL STOMACH AS NEEDED FOR PAIN 30 tablet 12    Erenumab-aooe (AIMOVIG) 70 MG/ML SOAJ INJECT 1 PEN MONTHLY 3 mL 12    Atogepant (QULIPTA) 30 MG TABS Take 30 mg by mouth daily 90 tablet 3     No current facility-administered medications for this visit.      Allergies:  Penicillins and Tramadol  History:  Past Medical History:   Diagnosis Date    Essential hypertension, benign 2011    GERD (gastroesophageal reflux disease) 2016    Hyperlipidemia 2011    Migraine     Osteoarthritis of right knee 2011    Pyriformis syndrome 2012    Screening mammogram for high-risk patient *2019    Benign    Screening mammogram, encounter for *May 26, 2020     Family:  Family History   Problem Relation Age of Onset    Ovarian Cancer Maternal Aunt     Cancer Maternal Grandmother     Cancer Paternal Grandfather     Other Father          - cause unknown     Diabetes Mother 80        Alive - DM, Hypertension, heart disease     Rheum Arthritis Neg Hx     Osteoarthritis Neg Hx     Asthma Neg Hx     Breast Cancer Neg Hx     Heart Failure Neg Hx     High Cholesterol Neg Hx     Hypertension Neg Hx     Migraines Neg Hx     Rashes/Skin Problems Neg Hx     Seizures Neg Hx     Stroke Neg Hx     Thyroid Disease Neg Hx      Social history:  Social History     Socioeconomic History    Marital status:       Spouse name: Not on file    Number of children: 3    Years of education: Not on file    Highest education level: Not on file   Occupational History    Occupation: accounts receivable      Comment: [employer]    Tobacco Use    Smoking status: Former     Packs/day: 0.50     Years: 13.00     Pack years: 6.50     Types: Cigarettes     Quit date: 2000     Years since quittin.6    Smokeless tobacco: Never   Vaping Use    Vaping Use: Never used   Substance and Sexual Activity    Alcohol use: Not Currently     Alcohol/week: 0.0 standard drinks    Drug use: No    Sexual activity: Yes     Partners: Male   Other Topics Concern    Not on file   Social History Narrative    Living Will:  No     Social Determinants of Health     Financial Resource Strain: Not on file   Food Insecurity: Not on file   Transportation Needs: Not on file   Physical Activity: Not on file   Stress: Not on file   Social Connections: Not on file   Intimate Partner Violence: Not on file   Housing Stability: Not on file     Surgical history:  Past Surgical History:   Procedure Laterality Date    COLONOSCOPY  *May 13, 2016 (  )    Dr. Jax Dickey - diverticulosis, polyp ( colonoc mucosa )    ECTOPIC PREGNANCY SURGERY      ENDOMETRIAL ABLATION      HYSTERECTOMY ([facility])      Abena Victor      Dr. Ortiz Every  *May, 2016    Dr. Jax Dickey - erosive esophagitis    UPPER GASTROINTESTINAL ENDOSCOPY  *2019    Dr. Jax Dickey - gastric nodule ( biopsy benign )    UPPER GASTROINTESTINAL ENDOSCOPY N/A 2021    EGD ESOPHAGOGASTRODUODENOSCOPY ULTRASOUND performed by Rosita Baez MD at [facility] ENDOSCOPY    UPPER GASTROINTESTINAL ENDOSCOPY N/A 8/31/2021    EGD W/ EMR performed by Kaz Ruth MD at [address] [place]:  Constitutional: Denies unexplained weight loss. Skin-Denies rashes or unhealing wounds  Neuro- Denies dizziness, headache, or seizures. HEENT- Denies vision disturbances, tinnitus, vertigo, sinus congestion, or sore throat  Cardiovascular: Denies chest pain, palpitations, dyspnea, or syncope. Respiratory- Denies SOB, wheezing, hemoptysis, or difficulty breathing. - Denies dysuria or hematuria   GI- Denies abdominal pain, nausea/vomiting, or dysphagia. Musculoskeletal: Denies joint pain  Other- Can climb a flight of stairs or walk up a hill without chest pain or shortness of breath. Physical Exam:  Vital signs:   Vitals:    09/06/22 1517   BP: 122/84   Site: Left Upper Arm   Position: Sitting   Cuff Size: Medium Adult   Pulse: 86   Temp: 97.3 °F (36.3 °C)   SpO2: 97%   Weight: 188 lb (85.3 kg)     Constitutional: Alert and oriented x 3, no apparent distress  HEENT: PERRL, EOMI, moist mucus membranes  Neck: Supple. Resp: CTA bilaterally, no wheezes or rhonchi  Cardio: RRR without MRG. GI: Soft, nontender, nondistended, BS+  Extremities: No edema  Neurological: Grossly intact. Skin: Warm & dry    Additional testing:   No visits with results within 1 Month(s) from this visit.    Latest known visit with results is:   Orders Only on 08/03/2022   Component Date Value Ref Range Status    TSH 08/03/2022 1.66  0.27 - 4.20 uIU/mL Final    Vit D, 25-Hydroxy 08/03/2022 16.4 (A) >=30 ng/mL Final    Cholesterol, Fasting 08/03/2022 226 (A) 0 - 199 mg/dL Final    Triglyceride, Fasting 08/03/2022 119  0 - 150 mg/dL Final    HDL 08/03/2022 57  40 - 60 mg/dL Final    LDL Calculated 08/03/2022 145 (A) <100 mg/dL Final    VLDL Cholesterol Calculated 08/03/2022 24  Not Established mg/dL Final    Sodium 08/03/2022 142  136 - 145 mmol/L Final Potassium 08/03/2022 4.5  3.5 - 5.1 mmol/L Final    Chloride 08/03/2022 106  99 - 110 mmol/L Final    CO2 08/03/2022 25  21 - 32 mmol/L Final    Anion Gap 08/03/2022 11  3 - 16 Final    Glucose 08/03/2022 86  70 - 99 mg/dL Final    BUN 08/03/2022 13  7 - 20 mg/dL Final    Creatinine 08/03/2022 0.6  0.6 - 1.2 mg/dL Final    GFR Non- 08/03/2022 >60  >60 Final    GFR  08/03/2022 >60  >60 Final    Calcium 08/03/2022 9.0  8.3 - 10.6 mg/dL Final    Total Protein 08/03/2022 7.0  6.4 - 8.2 g/dL Final    Albumin 08/03/2022 4.1  3.4 - 5.0 g/dL Final    Albumin/Globulin Ratio 08/03/2022 1.4  1.1 - 2.2 Final    Total Bilirubin 08/03/2022 0.3  0.0 - 1.0 mg/dL Final    Alkaline Phosphatase 08/03/2022 51  40 - 129 U/L Final    ALT 08/03/2022 15  10 - 40 U/L Final    AST 08/03/2022 14 (A) 15 - 37 U/L Final    WBC 08/03/2022 5.7  4.0 - 11.0 K/uL Final    RBC 08/03/2022 4.47  4.00 - 5.20 M/uL Final    Hemoglobin 08/03/2022 12.8  12.0 - 16.0 g/dL Final    Hematocrit 08/03/2022 38.5  36.0 - 48.0 % Final    MCV 08/03/2022 86.1  80.0 - 100.0 fL Final    MCH 08/03/2022 28.6  26.0 - 34.0 pg Final    MCHC 08/03/2022 33.2  31.0 - 36.0 g/dL Final    RDW 08/03/2022 14.2  12.4 - 15.4 % Final    Platelets 67/95/4187 180  135 - 450 K/uL Final    MPV 08/03/2022 9.5  5.0 - 10.5 fL Final             Assessment:   Diagnosis Orders   1. Essential hypertension, benign  EKG 12 lead    EKG 12 lead      2. DDD (degenerative disc disease), lumbar        3. Intractable chronic migraine without aura and with status migrainosus            Functional capacity: > 4 METs    Inherent cardiac risk of planned procedure: High (>5%): Intermediate (1-5%); Low (<1%)    Revised Cardiac Index Score:   1 point for each of the following: high-risk surgery, CAD, insulin, CKD/Cr>2, history of stroke/TIA, and CHF. Risk for cardiac complications (ischemia, MI, arrhythmia)    0 points=0.4%, 1 point=0.9%, 2 points=4%, 3+ points=9%.    Patient score: 0.9%    Plan:   1. - Risk/benefit analysis favors proceding with the planned operation.      Electronically signed by: ANTONETTE Gaviria CNP, 9/6/2022, 3:49 PM

## 2022-10-06 PROBLEM — Z01.811 PRE-OP CHEST EXAM: Status: RESOLVED | Noted: 2021-09-09 | Resolved: 2022-10-06

## 2022-10-13 RX ORDER — BUTALBITAL, ACETAMINOPHEN AND CAFFEINE 300; 40; 50 MG/1; MG/1; MG/1
CAPSULE ORAL
Qty: 25 CAPSULE | Refills: 0 | OUTPATIENT
Start: 2022-10-13

## 2022-10-13 RX ORDER — BUTALBITAL, ACETAMINOPHEN AND CAFFEINE 300; 40; 50 MG/1; MG/1; MG/1
1 CAPSULE ORAL EVERY 6 HOURS PRN
Qty: 25 CAPSULE | Refills: 0 | Status: SHIPPED | OUTPATIENT
Start: 2022-10-13

## 2022-11-21 RX ORDER — BUTALBITAL, ACETAMINOPHEN AND CAFFEINE 300; 40; 50 MG/1; MG/1; MG/1
1 CAPSULE ORAL EVERY 6 HOURS PRN
Qty: 25 CAPSULE | Refills: 0 | Status: SHIPPED | OUTPATIENT
Start: 2022-11-21

## 2022-12-19 RX ORDER — ATOGEPANT 30 MG/1
30 TABLET ORAL DAILY
Qty: 90 TABLET | Refills: 4 | Status: SHIPPED | OUTPATIENT
Start: 2022-12-19

## 2022-12-20 ENCOUNTER — TELEPHONE (OUTPATIENT)
Dept: ORTHOPEDIC SURGERY | Age: 62
End: 2022-12-20

## 2022-12-29 RX ORDER — BUTALBITAL, ACETAMINOPHEN AND CAFFEINE 300; 40; 50 MG/1; MG/1; MG/1
1 CAPSULE ORAL EVERY 6 HOURS PRN
Qty: 25 CAPSULE | Refills: 0 | OUTPATIENT
Start: 2022-12-29

## 2022-12-29 RX ORDER — BUTALBITAL, ACETAMINOPHEN AND CAFFEINE 300; 40; 50 MG/1; MG/1; MG/1
CAPSULE ORAL
Qty: 25 CAPSULE | Refills: 0 | OUTPATIENT
Start: 2022-12-29

## 2022-12-30 RX ORDER — BUTALBITAL, ACETAMINOPHEN AND CAFFEINE 300; 40; 50 MG/1; MG/1; MG/1
1 CAPSULE ORAL EVERY 6 HOURS PRN
Qty: 25 CAPSULE | Refills: 0 | Status: SHIPPED | OUTPATIENT
Start: 2022-12-30

## 2023-01-04 ENCOUNTER — TELEPHONE (OUTPATIENT)
Dept: FAMILY MEDICINE CLINIC | Age: 63
End: 2023-01-04

## 2023-01-04 RX ORDER — ATOGEPANT 30 MG/1
30 TABLET ORAL DAILY
Qty: 90 TABLET | Refills: 3 | Status: CANCELLED | OUTPATIENT
Start: 2023-01-04

## 2023-01-04 NOTE — TELEPHONE ENCOUNTER
PA is needed for a continuation for treatment. Pt has been on this since July 2022 through [facility] program and has been on this for 6 months. Can this information be added to PA as well as the pt has been doing well with this medication and wants to continue.

## 2023-01-11 ENCOUNTER — APPOINTMENT (OUTPATIENT)
Dept: GENERAL RADIOLOGY | Age: 63
DRG: 246 | End: 2023-01-11
Payer: COMMERCIAL

## 2023-01-11 ENCOUNTER — HOSPITAL ENCOUNTER (INPATIENT)
Age: 63
LOS: 2 days | Discharge: HOME | DRG: 246 | End: 2023-01-13
Attending: EMERGENCY MEDICINE | Admitting: INTERNAL MEDICINE
Payer: COMMERCIAL

## 2023-01-11 DIAGNOSIS — R07.9 CHEST PAIN, UNSPECIFIED TYPE: Primary | ICD-10-CM

## 2023-01-11 DIAGNOSIS — R94.39 ABNORMAL STRESS TEST: ICD-10-CM

## 2023-01-11 LAB
ALBUMIN SERPL-MCNC: 3.9 G/DL (ref 3.4–5)
ALP BLD-CCNC: 60 U/L (ref 40–129)
ALT SERPL-CCNC: 12 U/L (ref 10–40)
ANION GAP SERPL CALCULATED.3IONS-SCNC: 10 MMOL/L (ref 3–16)
ANION GAP SERPL CALCULATED.3IONS-SCNC: 13 MMOL/L (ref 3–16)
APTT: 26.3 SEC (ref 23–34.3)
AST SERPL-CCNC: 17 U/L (ref 15–37)
BASOPHILS ABSOLUTE: 0.1 K/UL (ref 0–0.2)
BASOPHILS RELATIVE PERCENT: 1.1 %
BILIRUB SERPL-MCNC: <0.2 MG/DL (ref 0–1)
BILIRUBIN DIRECT: <0.2 MG/DL (ref 0–0.3)
BILIRUBIN, INDIRECT: NORMAL MG/DL (ref 0–1)
BUN BLDV-MCNC: 10 MG/DL (ref 7–20)
BUN BLDV-MCNC: 14 MG/DL (ref 7–20)
CALCIUM SERPL-MCNC: 9.1 MG/DL (ref 8.3–10.6)
CALCIUM SERPL-MCNC: 9.1 MG/DL (ref 8.3–10.6)
CHLORIDE BLD-SCNC: 100 MMOL/L (ref 99–110)
CHLORIDE BLD-SCNC: 100 MMOL/L (ref 99–110)
CHOLESTEROL, TOTAL: 235 MG/DL (ref 0–199)
CO2: 22 MMOL/L (ref 21–32)
CO2: 23 MMOL/L (ref 21–32)
CREAT SERPL-MCNC: 0.5 MG/DL (ref 0.6–1.2)
CREAT SERPL-MCNC: <0.5 MG/DL (ref 0.6–1.2)
EKG ATRIAL RATE: 101 BPM
EKG DIAGNOSIS: NORMAL
EKG P AXIS: 56 DEGREES
EKG P-R INTERVAL: 156 MS
EKG Q-T INTERVAL: 362 MS
EKG QRS DURATION: 92 MS
EKG QTC CALCULATION (BAZETT): 469 MS
EKG R AXIS: -15 DEGREES
EKG T AXIS: -54 DEGREES
EKG VENTRICULAR RATE: 101 BPM
EOSINOPHILS ABSOLUTE: 0.1 K/UL (ref 0–0.6)
EOSINOPHILS RELATIVE PERCENT: 1.5 %
GFR SERPL CREATININE-BSD FRML MDRD: >60 ML/MIN/{1.73_M2}
GFR SERPL CREATININE-BSD FRML MDRD: >60 ML/MIN/{1.73_M2}
GLUCOSE BLD-MCNC: 106 MG/DL (ref 70–99)
GLUCOSE BLD-MCNC: 93 MG/DL (ref 70–99)
HCT VFR BLD CALC: 37.1 % (ref 36–48)
HCT VFR BLD CALC: 37.3 % (ref 36–48)
HDLC SERPL-MCNC: 53 MG/DL (ref 40–60)
HEMOGLOBIN: 12.1 G/DL (ref 12–16)
HEMOGLOBIN: 12.4 G/DL (ref 12–16)
INR BLD: 1.11 (ref 0.87–1.14)
LDL CHOLESTEROL CALCULATED: 157 MG/DL
LIPASE: 33 U/L (ref 13–60)
LV EF: 49 %
LVEF MODALITY: NORMAL
LYMPHOCYTES ABSOLUTE: 3 K/UL (ref 1–5.1)
LYMPHOCYTES RELATIVE PERCENT: 31.5 %
MCH RBC QN AUTO: 27.7 PG (ref 26–34)
MCH RBC QN AUTO: 27.8 PG (ref 26–34)
MCHC RBC AUTO-ENTMCNC: 32.4 G/DL (ref 31–36)
MCHC RBC AUTO-ENTMCNC: 33.3 G/DL (ref 31–36)
MCV RBC AUTO: 83 FL (ref 80–100)
MCV RBC AUTO: 85.9 FL (ref 80–100)
MONOCYTES ABSOLUTE: 0.7 K/UL (ref 0–1.3)
MONOCYTES RELATIVE PERCENT: 7 %
NEUTROPHILS ABSOLUTE: 5.6 K/UL (ref 1.7–7.7)
NEUTROPHILS RELATIVE PERCENT: 58.9 %
PDW BLD-RTO: 14.5 % (ref 12.4–15.4)
PDW BLD-RTO: 14.9 % (ref 12.4–15.4)
PLATELET # BLD: 169 K/UL (ref 135–450)
PLATELET # BLD: 203 K/UL (ref 135–450)
PMV BLD AUTO: 9.5 FL (ref 5–10.5)
PMV BLD AUTO: 9.6 FL (ref 5–10.5)
POTASSIUM SERPL-SCNC: 3.7 MMOL/L (ref 3.5–5.1)
POTASSIUM SERPL-SCNC: 3.8 MMOL/L (ref 3.5–5.1)
PROTHROMBIN TIME: 14.2 SEC (ref 11.7–14.5)
RBC # BLD: 4.34 M/UL (ref 4–5.2)
RBC # BLD: 4.47 M/UL (ref 4–5.2)
SODIUM BLD-SCNC: 133 MMOL/L (ref 136–145)
SODIUM BLD-SCNC: 135 MMOL/L (ref 136–145)
TOTAL PROTEIN: 7.4 G/DL (ref 6.4–8.2)
TRIGL SERPL-MCNC: 125 MG/DL (ref 0–150)
TROPONIN: <0.01 NG/ML
VLDLC SERPL CALC-MCNC: 25 MG/DL
WBC # BLD: 5.9 K/UL (ref 4–11)
WBC # BLD: 9.4 K/UL (ref 4–11)

## 2023-01-11 PROCEDURE — 6370000000 HC RX 637 (ALT 250 FOR IP): Performed by: NURSE PRACTITIONER

## 2023-01-11 PROCEDURE — 6370000000 HC RX 637 (ALT 250 FOR IP): Performed by: EMERGENCY MEDICINE

## 2023-01-11 PROCEDURE — 1200000000 HC SEMI PRIVATE

## 2023-01-11 PROCEDURE — 3430000000 HC RX DIAGNOSTIC RADIOPHARMACEUTICAL: Performed by: EMERGENCY MEDICINE

## 2023-01-11 PROCEDURE — 80048 BASIC METABOLIC PNL TOTAL CA: CPT

## 2023-01-11 PROCEDURE — 71046 X-RAY EXAM CHEST 2 VIEWS: CPT

## 2023-01-11 PROCEDURE — 93005 ELECTROCARDIOGRAM TRACING: CPT | Performed by: EMERGENCY MEDICINE

## 2023-01-11 PROCEDURE — 93017 CV STRESS TEST TRACING ONLY: CPT

## 2023-01-11 PROCEDURE — 85027 COMPLETE CBC AUTOMATED: CPT

## 2023-01-11 PROCEDURE — 6360000002 HC RX W HCPCS: Performed by: EMERGENCY MEDICINE

## 2023-01-11 PROCEDURE — 6360000002 HC RX W HCPCS: Performed by: INTERNAL MEDICINE

## 2023-01-11 PROCEDURE — 96376 TX/PRO/DX INJ SAME DRUG ADON: CPT

## 2023-01-11 PROCEDURE — 84484 ASSAY OF TROPONIN QUANT: CPT

## 2023-01-11 PROCEDURE — 85025 COMPLETE CBC W/AUTO DIFF WBC: CPT

## 2023-01-11 PROCEDURE — 99285 EMERGENCY DEPT VISIT HI MDM: CPT

## 2023-01-11 PROCEDURE — 2580000003 HC RX 258: Performed by: EMERGENCY MEDICINE

## 2023-01-11 PROCEDURE — 2580000003 HC RX 258: Performed by: INTERNAL MEDICINE

## 2023-01-11 PROCEDURE — 83690 ASSAY OF LIPASE: CPT

## 2023-01-11 PROCEDURE — 85730 THROMBOPLASTIN TIME PARTIAL: CPT

## 2023-01-11 PROCEDURE — 6370000000 HC RX 637 (ALT 250 FOR IP): Performed by: INTERNAL MEDICINE

## 2023-01-11 PROCEDURE — A9502 TC99M TETROFOSMIN: HCPCS | Performed by: EMERGENCY MEDICINE

## 2023-01-11 PROCEDURE — 85610 PROTHROMBIN TIME: CPT

## 2023-01-11 PROCEDURE — 99223 1ST HOSP IP/OBS HIGH 75: CPT | Performed by: INTERNAL MEDICINE

## 2023-01-11 PROCEDURE — 80061 LIPID PANEL: CPT

## 2023-01-11 PROCEDURE — 99223 1ST HOSP IP/OBS HIGH 75: CPT | Performed by: NURSE PRACTITIONER

## 2023-01-11 PROCEDURE — 78452 HT MUSCLE IMAGE SPECT MULT: CPT

## 2023-01-11 PROCEDURE — 80076 HEPATIC FUNCTION PANEL: CPT

## 2023-01-11 PROCEDURE — 96374 THER/PROPH/DIAG INJ IV PUSH: CPT

## 2023-01-11 PROCEDURE — 96375 TX/PRO/DX INJ NEW DRUG ADDON: CPT

## 2023-01-11 PROCEDURE — 2580000003 HC RX 258: Performed by: NURSE PRACTITIONER

## 2023-01-11 PROCEDURE — 36415 COLL VENOUS BLD VENIPUNCTURE: CPT

## 2023-01-11 RX ORDER — ASPIRIN 81 MG/1
243 TABLET, CHEWABLE ORAL ONCE
Status: COMPLETED | OUTPATIENT
Start: 2023-01-11 | End: 2023-01-11

## 2023-01-11 RX ORDER — HEPARIN SODIUM 1000 [USP'U]/ML
2000 INJECTION, SOLUTION INTRAVENOUS; SUBCUTANEOUS PRN
Status: DISCONTINUED | OUTPATIENT
Start: 2023-01-11 | End: 2023-01-12 | Stop reason: SDUPTHER

## 2023-01-11 RX ORDER — SODIUM CHLORIDE 0.9 % (FLUSH) 0.9 %
5-40 SYRINGE (ML) INJECTION EVERY 12 HOURS SCHEDULED
Status: DISCONTINUED | OUTPATIENT
Start: 2023-01-11 | End: 2023-01-13 | Stop reason: HOSPADM

## 2023-01-11 RX ORDER — HEPARIN SODIUM 1000 [USP'U]/ML
4000 INJECTION, SOLUTION INTRAVENOUS; SUBCUTANEOUS PRN
Status: DISCONTINUED | OUTPATIENT
Start: 2023-01-11 | End: 2023-01-12 | Stop reason: SDUPTHER

## 2023-01-11 RX ORDER — METHOCARBAMOL 750 MG/1
750 TABLET, FILM COATED ORAL EVERY 6 HOURS PRN
COMMUNITY
Start: 2022-09-17

## 2023-01-11 RX ORDER — ACETAMINOPHEN 325 MG/1
650 TABLET ORAL EVERY 6 HOURS PRN
Status: DISCONTINUED | OUTPATIENT
Start: 2023-01-11 | End: 2023-01-13 | Stop reason: HOSPADM

## 2023-01-11 RX ORDER — ATORVASTATIN CALCIUM 40 MG/1
40 TABLET, FILM COATED ORAL NIGHTLY
Status: DISCONTINUED | OUTPATIENT
Start: 2023-01-11 | End: 2023-01-11

## 2023-01-11 RX ORDER — VITAMIN B COMPLEX
1000 TABLET ORAL DAILY
Status: DISCONTINUED | OUTPATIENT
Start: 2023-01-11 | End: 2023-01-13 | Stop reason: HOSPADM

## 2023-01-11 RX ORDER — ASPIRIN 81 MG/1
81 TABLET, CHEWABLE ORAL DAILY
Status: DISCONTINUED | OUTPATIENT
Start: 2023-01-12 | End: 2023-01-12 | Stop reason: SDUPTHER

## 2023-01-11 RX ORDER — SODIUM CHLORIDE 9 MG/ML
INJECTION, SOLUTION INTRAVENOUS PRN
Status: DISCONTINUED | OUTPATIENT
Start: 2023-01-11 | End: 2023-01-13 | Stop reason: HOSPADM

## 2023-01-11 RX ORDER — HEPARIN SODIUM 1000 [USP'U]/ML
60 INJECTION, SOLUTION INTRAVENOUS; SUBCUTANEOUS ONCE
Status: DISCONTINUED | OUTPATIENT
Start: 2023-01-11 | End: 2023-01-11

## 2023-01-11 RX ORDER — SODIUM CHLORIDE 0.9 % (FLUSH) 0.9 %
5-40 SYRINGE (ML) INJECTION PRN
Status: DISCONTINUED | OUTPATIENT
Start: 2023-01-11 | End: 2023-01-13 | Stop reason: HOSPADM

## 2023-01-11 RX ORDER — HEPARIN SODIUM 10000 [USP'U]/100ML
5-30 INJECTION, SOLUTION INTRAVENOUS CONTINUOUS
Status: DISCONTINUED | OUTPATIENT
Start: 2023-01-11 | End: 2023-01-12

## 2023-01-11 RX ORDER — ATORVASTATIN CALCIUM 40 MG/1
40 TABLET, FILM COATED ORAL NIGHTLY
Status: DISCONTINUED | OUTPATIENT
Start: 2023-01-11 | End: 2023-01-12 | Stop reason: SDUPTHER

## 2023-01-11 RX ORDER — ASPIRIN 81 MG/1
81 TABLET, CHEWABLE ORAL DAILY
Status: DISCONTINUED | OUTPATIENT
Start: 2023-01-12 | End: 2023-01-11 | Stop reason: SDUPTHER

## 2023-01-11 RX ORDER — ONDANSETRON 4 MG/1
4 TABLET, ORALLY DISINTEGRATING ORAL EVERY 8 HOURS PRN
Status: DISCONTINUED | OUTPATIENT
Start: 2023-01-11 | End: 2023-01-13 | Stop reason: HOSPADM

## 2023-01-11 RX ORDER — HEPARIN SODIUM 10000 [USP'U]/100ML
5-30 INJECTION, SOLUTION INTRAVENOUS CONTINUOUS
Status: DISCONTINUED | OUTPATIENT
Start: 2023-01-11 | End: 2023-01-11

## 2023-01-11 RX ORDER — NITROGLYCERIN 0.4 MG/1
0.4 TABLET SUBLINGUAL EVERY 5 MIN PRN
Status: COMPLETED | OUTPATIENT
Start: 2023-01-11 | End: 2023-01-11

## 2023-01-11 RX ORDER — ONDANSETRON 2 MG/ML
4 INJECTION INTRAMUSCULAR; INTRAVENOUS EVERY 6 HOURS PRN
Status: DISCONTINUED | OUTPATIENT
Start: 2023-01-11 | End: 2023-01-13 | Stop reason: HOSPADM

## 2023-01-11 RX ORDER — ACETAMINOPHEN 650 MG/1
650 SUPPOSITORY RECTAL EVERY 6 HOURS PRN
Status: DISCONTINUED | OUTPATIENT
Start: 2023-01-11 | End: 2023-01-13 | Stop reason: HOSPADM

## 2023-01-11 RX ORDER — HEPARIN SODIUM 1000 [USP'U]/ML
4000 INJECTION, SOLUTION INTRAVENOUS; SUBCUTANEOUS ONCE
Status: COMPLETED | OUTPATIENT
Start: 2023-01-11 | End: 2023-01-11

## 2023-01-11 RX ORDER — POLYETHYLENE GLYCOL 3350 17 G/17G
17 POWDER, FOR SOLUTION ORAL DAILY PRN
Status: DISCONTINUED | OUTPATIENT
Start: 2023-01-11 | End: 2023-01-13 | Stop reason: HOSPADM

## 2023-01-11 RX ORDER — MORPHINE SULFATE 4 MG/ML
4 INJECTION, SOLUTION INTRAMUSCULAR; INTRAVENOUS ONCE
Status: COMPLETED | OUTPATIENT
Start: 2023-01-11 | End: 2023-01-11

## 2023-01-11 RX ADMIN — ASPIRIN 243 MG: 81 TABLET, CHEWABLE ORAL at 04:06

## 2023-01-11 RX ADMIN — REGADENOSON 0.4 MG: 0.08 INJECTION, SOLUTION INTRAVENOUS at 10:09

## 2023-01-11 RX ADMIN — NITROGLYCERIN 0.4 MG: 0.4 TABLET, ORALLY DISINTEGRATING SUBLINGUAL at 23:49

## 2023-01-11 RX ADMIN — SODIUM CHLORIDE, PRESERVATIVE FREE 10 ML: 5 INJECTION INTRAVENOUS at 09:00

## 2023-01-11 RX ADMIN — HEPARIN SODIUM 11 UNITS/KG/HR: 5000 INJECTION, SOLUTION INTRAVENOUS; SUBCUTANEOUS at 19:10

## 2023-01-11 RX ADMIN — SODIUM CHLORIDE, PRESERVATIVE FREE 10 ML: 5 INJECTION INTRAVENOUS at 20:42

## 2023-01-11 RX ADMIN — MORPHINE SULFATE 4 MG: 4 INJECTION INTRAVENOUS at 05:57

## 2023-01-11 RX ADMIN — TETROFOSMIN 10 MILLICURIE: 1.38 INJECTION, POWDER, LYOPHILIZED, FOR SOLUTION INTRAVENOUS at 08:58

## 2023-01-11 RX ADMIN — HEPARIN SODIUM 4000 UNITS: 1000 INJECTION INTRAVENOUS; SUBCUTANEOUS at 19:06

## 2023-01-11 RX ADMIN — ATORVASTATIN CALCIUM 40 MG: 40 TABLET, FILM COATED ORAL at 20:42

## 2023-01-11 RX ADMIN — NITROGLYCERIN 0.4 MG: 0.4 TABLET, ORALLY DISINTEGRATING SUBLINGUAL at 04:07

## 2023-01-11 RX ADMIN — NITROGLYCERIN 0.4 MG: 0.4 TABLET, ORALLY DISINTEGRATING SUBLINGUAL at 04:51

## 2023-01-11 RX ADMIN — Medication 1000 UNITS: at 18:52

## 2023-01-11 RX ADMIN — METOPROLOL TARTRATE 25 MG: 25 TABLET, FILM COATED ORAL at 20:42

## 2023-01-11 RX ADMIN — TETROFOSMIN 30 MILLICURIE: 1.38 INJECTION, POWDER, LYOPHILIZED, FOR SOLUTION INTRAVENOUS at 10:10

## 2023-01-11 ASSESSMENT — PAIN DESCRIPTION - DESCRIPTORS
DESCRIPTORS: HEAVINESS
DESCRIPTORS: PRESSURE
DESCRIPTORS: TIGHTNESS
DESCRIPTORS: TIGHTNESS

## 2023-01-11 ASSESSMENT — PAIN SCALES - GENERAL
PAINLEVEL_OUTOF10: 6
PAINLEVEL_OUTOF10: 7
PAINLEVEL_OUTOF10: 3
PAINLEVEL_OUTOF10: 5
PAINLEVEL_OUTOF10: 6

## 2023-01-11 ASSESSMENT — ENCOUNTER SYMPTOMS
RESPIRATORY NEGATIVE: 1
CONSTIPATION: 0
VOMITING: 0
NAUSEA: 1
ABDOMINAL PAIN: 0
DIARRHEA: 0
EYES NEGATIVE: 1

## 2023-01-11 ASSESSMENT — PAIN DESCRIPTION - LOCATION
LOCATION: CHEST

## 2023-01-11 ASSESSMENT — PAIN DESCRIPTION - ORIENTATION
ORIENTATION: MID
ORIENTATION: MID
ORIENTATION: INNER
ORIENTATION: MID
ORIENTATION: MID

## 2023-01-11 ASSESSMENT — PAIN DESCRIPTION - FREQUENCY: FREQUENCY: CONTINUOUS

## 2023-01-11 ASSESSMENT — PAIN - FUNCTIONAL ASSESSMENT
PAIN_FUNCTIONAL_ASSESSMENT: 0-10
PAIN_FUNCTIONAL_ASSESSMENT: ACTIVITIES ARE NOT PREVENTED
PAIN_FUNCTIONAL_ASSESSMENT: ACTIVITIES ARE NOT PREVENTED

## 2023-01-11 ASSESSMENT — PAIN DESCRIPTION - PAIN TYPE: TYPE: ACUTE PAIN

## 2023-01-11 ASSESSMENT — PAIN DESCRIPTION - ONSET: ONSET: ON-GOING

## 2023-01-11 ASSESSMENT — PAIN DESCRIPTION - DIRECTION: RADIATING_TOWARDS: BACK

## 2023-01-11 NOTE — ED TRIAGE NOTES
Patient arrived in the ER with c/o chest pain. Patient states that the pain chest woke her up from her sleep. She took a baby Asprin and antacid .

## 2023-01-11 NOTE — ED PROVIDER NOTES
[address] [facility]          ATTENDING PHYSICIAN NOTE       Date of evaluation: 1/11/2023    Chief Complaint     Chest Pain      History of Present Illness     Deborah Prasad is a 65 y.o. female who presents to the emergency department complaining of chest pain. Patient states the pain woke her up from sleep. She describes the pain as a pressure sensation located in center of her chest.  She states it did radiate occasionally to the right shoulder. She denies any associated shortness of breath. She does note nausea but denies any vomiting or diarrhea. She denies any swelling or pain in her extremities. She does not note any inciting or relieving factors to the pain. She did take a baby aspirin and Tums at home without significant change in her symptoms. She states she had similar symptoms several years ago and had a stress test at that time which was negative. ASSESSMENT / PLAN  (MEDICAL DECISION MAKING)     INITIAL VITALS: BP: (!) 155/104, Temp: 97.7 °F (36.5 °C), Heart Rate: 93, Resp: 18, SpO2: 97 %      Deborah Maurer is a 65 y.o. female presents to the emergency department complaining of chest pain. Patient describes pain that woke her up from sleep as a pressure sensation in the center of her chest with radiation to the left arm. Patient has clear breath sounds and normal heart sounds. EKG shows no acute ischemic abnormalities. Troponin x2 are negative. Patient was given sublingual nitroglycerin with no significant change in her symptoms. She was given morphine with improvement of her pain. Patient has a heart score of 4 (1 for age, 1 for history, 1 for EKG, and 1 for risk factors). Patient is amenable to stress test.  Patient will be admitted into ED observation to facilitate stress testing in the morning. Medical Decision Making  Amount and/or Complexity of Data Reviewed  External Data Reviewed: labs, ECG and notes. Labs: ordered.   Radiology: ordered. ECG/medicine tests: ordered. Risk  OTC drugs. Prescription drug management. Clinical Impression     1. Chest pain, unspecified type        Disposition     PATIENT REFERRED TO:  No follow-up provider specified. DISCHARGE MEDICATIONS:  New Prescriptions    No medications on file       DISPOSITION Ed Observation 01/11/2023 06:20:36 AM        Diagnostic Results and Other Data       RADIOLOGY:  XR CHEST (2 VW)   Final Result      Clear lungs. Normal cardiomediastinal silhouette. Lumbar spine fixation hardware incompletely visualized.       NM Cardiac Stress Test Nuclear Imaging    (Results Pending)       LABS:   Results for orders placed or performed during the hospital encounter of 01/11/23   CBC with Auto Differential   Result Value Ref Range    WBC 9.4 4.0 - 11.0 K/uL    RBC 4.47 4.00 - 5.20 M/uL    Hemoglobin 12.4 12.0 - 16.0 g/dL    Hematocrit 37.1 36.0 - 48.0 %    MCV 83.0 80.0 - 100.0 fL    MCH 27.7 26.0 - 34.0 pg    MCHC 33.3 31.0 - 36.0 g/dL    RDW 14.5 12.4 - 15.4 %    Platelets 803 058 - 535 K/uL    MPV 9.6 5.0 - 10.5 fL    Neutrophils % 58.9 %    Lymphocytes % 31.5 %    Monocytes % 7.0 %    Eosinophils % 1.5 %    Basophils % 1.1 %    Neutrophils Absolute 5.6 1.7 - 7.7 K/uL    Lymphocytes Absolute 3.0 1.0 - 5.1 K/uL    Monocytes Absolute 0.7 0.0 - 1.3 K/uL    Eosinophils Absolute 0.1 0.0 - 0.6 K/uL    Basophils Absolute 0.1 0.0 - 0.2 K/uL   Basic Metabolic Panel   Result Value Ref Range    Sodium 133 (L) 136 - 145 mmol/L    Potassium 3.7 3.5 - 5.1 mmol/L    Chloride 100 99 - 110 mmol/L    CO2 23 21 - 32 mmol/L    Anion Gap 10 3 - 16    Glucose 106 (H) 70 - 99 mg/dL    BUN 14 7 - 20 mg/dL    Creatinine 0.5 (L) 0.6 - 1.2 mg/dL    Est, Glom Filt Rate >60 >60    Calcium 9.1 8.3 - 10.6 mg/dL   Troponin   Result Value Ref Range    Troponin <0.01 <0.01 ng/mL   Hepatic Function Panel   Result Value Ref Range    Total Protein 7.4 6.4 - 8.2 g/dL    Albumin 3.9 3.4 - 5.0 g/dL    Alkaline Phosphatase 60 40 - 129 U/L    ALT 12 10 - 40 U/L    AST 17 15 - 37 U/L    Total Bilirubin <0.2 0.0 - 1.0 mg/dL    Bilirubin, Direct <0.2 0.0 - 0.3 mg/dL    Bilirubin, Indirect see below 0.0 - 1.0 mg/dL   Lipase   Result Value Ref Range    Lipase 33.0 13.0 - 60.0 U/L   Troponin   Result Value Ref Range    Troponin <0.01 <0.01 ng/mL   EKG 12 Lead   Result Value Ref Range    Ventricular Rate 101 BPM    Atrial Rate 101 BPM    P-R Interval 156 ms    QRS Duration 92 ms    Q-T Interval 362 ms    QTc Calculation (Bazett) 469 ms    P Axis 56 degrees    R Axis -15 degrees    T Axis -54 degrees    Diagnosis       EKG performed in ER and to be interpreted by ER physician. Confirmed by MD, ER ([id number]),  El Cooper ([id number]) on 1/11/2023 6:10:58 AM     EKG   EKG as interpreted by me shows the patient to be in a sinus tachycardic rhythm with a rate of 101, left axis deviation, normal MA and QT intervals, normal QRS duration, no ST segment abnormalities, T wave inversions in the inferior and lateral leads. ED BEDSIDE ULTRASOUND:  No results found. MOST RECENT VITALS:  BP: (!) 131/99,Temp: 97.7 °F (36.5 °C), Heart Rate: (!) 102, Resp: 18, SpO2: 95 %     Procedures     N/A    ED Course     Nursing Notes, Past Medical Hx, Past Surgical Hx, Social Hx,Allergies, and Family Hx were reviewed. The patient was given the following medications:  Orders Placed This Encounter   Medications    aspirin chewable tablet 243 mg    nitroGLYCERIN (NITROSTAT) SL tablet 0.4 mg    morphine injection 4 mg    sodium chloride flush 0.9 % injection 5-40 mL    sodium chloride flush 0.9 % injection 5-40 mL    0.9 % sodium chloride infusion    aspirin chewable tablet 81 mg    regadenoson (LEXISCAN) injection 0.4 mg       CONSULTS:  None    Review of Systems     Review of Systems   Constitutional: Negative. HENT: Negative. Eyes: Negative. Respiratory: Negative. Cardiovascular:  Positive for chest pain.    Gastrointestinal: Positive for nausea. Negative for abdominal pain, constipation, diarrhea and vomiting. Genitourinary: Negative. Musculoskeletal: Negative. Neurological: Negative. All other systems reviewed and are negative. Past Medical, Surgical, Family, and Social History     She has a past medical history of Essential hypertension, benign, GERD (gastroesophageal reflux disease), Hyperlipidemia, Migraine, Osteoarthritis of right knee, Pyriformis syndrome, Screening mammogram for high-risk patient, and Screening mammogram, encounter for. She has a past surgical history that includes Hysterectomy; Endometrial ablation; Ectopic pregnancy surgery; lumbar fusion (2014); Upper gastrointestinal endoscopy (*May, 2016); Colonoscopy (*May 13, 2016 ( 2026 )); Upper gastrointestinal endoscopy (*Sept.24, 2019); US Breast Fine Needle Aspiration; Ovary removal; Upper gastrointestinal endoscopy (N/A, 8/31/2021); and Upper gastrointestinal endoscopy (N/A, 8/31/2021). Her family history includes Cancer in her maternal grandmother and paternal grandfather; Diabetes (age of onset: 80) in her mother; Other in her father; Ovarian Cancer in her maternal aunt. She reports that she quit smoking about 23 years ago. She has a 6.50 pack-year smoking history. She has never used smokeless tobacco. She reports that she does not currently use alcohol. She reports that she does not use drugs.     Medications     Previous Medications    ATOGEPANT (QULIPTA) 30 MG TABS    Take 30 mg by mouth daily    ATOGEPANT (QULIPTA) 30 MG TABS    Take 30 mg by mouth daily    BUTALBITAL-APAP-CAFFEINE -40 MG CAPS PER CAPSULE    Take 1 capsule by mouth every 6 hours as needed for Headaches or Migraine    ERENUMAB-AOOE (AIMOVIG) 70 MG/ML SOAJ    INJECT 1 PEN MONTHLY    METHOCARBAMOL (ROBAXIN) 750 MG TABLET    Take 750 mg by mouth every 6 hours as needed    NAPROXEN (NAPROSYN) 500 MG TABLET    TAKE 1 TABLET BY MOUTH TWICE DAILY ON A FULL STOMACH AS NEEDED FOR PAIN    OMEPRAZOLE (PRILOSEC) 20 MG DELAYED RELEASE CAPSULE    TAKE 1 CAPSULE BY MOUTH TWICE DAILY       Allergies     She is allergic to penicillins and tramadol. Physical Exam     INITIAL VITALS: BP: (!) 155/104, Temp: 97.7 °F (36.5 °C), Heart Rate: 93, Resp: 18, SpO2: 97 %   Physical Exam  Vitals and nursing note reviewed. Constitutional:       General: She is not in acute distress. HENT:      Head: Normocephalic and atraumatic. Mouth/Throat:      Mouth: Mucous membranes are moist.      Pharynx: No oropharyngeal exudate. Eyes:      General: No scleral icterus. Extraocular Movements: Extraocular movements intact. Conjunctiva/sclera: Conjunctivae normal.      Pupils: Pupils are equal, round, and reactive to light. Cardiovascular:      Rate and Rhythm: Normal rate and regular rhythm. Heart sounds: Normal heart sounds. Pulmonary:      Effort: Pulmonary effort is normal.      Breath sounds: Normal breath sounds. No wheezing, rhonchi or rales. Abdominal:      General: Bowel sounds are normal.      Palpations: Abdomen is soft. Tenderness: There is no abdominal tenderness. There is no guarding or rebound. Musculoskeletal:         General: No swelling. Normal range of motion. Cervical back: Normal range of motion and neck supple. Skin:     General: Skin is warm and dry. Neurological:      General: No focal deficit present. Mental Status: She is alert and oriented to person, place, and time. Cranial Nerves: No cranial nerve deficit. Motor: No weakness.       Coordination: Coordination normal.                  Raquel Duarte MD  01/11/23 1319

## 2023-01-11 NOTE — ED PROVIDER NOTES
[facility] Emergency Department  ED Observation Disposition Note   Emergency Physicians         Diagnostic Evaluation      Diagnostic studies germane to this period of clinical observation include:    Cardiac monitoring  Serial troponins  Nuclear stress test     Consultant(s) Final Recommendations      -Cardiology: Admit for urgent cardiac catheterization     Impression and Plan      400 Radha Cardenas has been cared for according to the standard [facility] observation protocol for chest pain. This extended period of observation was specifically required to determine the need for hospitalization. Prior to discharge from observation, the final physical exam is documented below. Significant events during the course of observation based on the goals of the clinical problem list include:    -Nuclear stress test -abnormal with concerning LAD distribution. Based on the patient's condition, clinical response, and diagnostic information obtained during this period of observation, the plan is to Admit to telemetry    The total length of observation was 11 hours. Dr. Frankie Wagoner is the observation disposition attending. As appropriate, please see the AVS for comprehensive discharge instructions. Rina Maurer has undergone comprehensive diagnostic evaluation and therapeutic management in accordance with the CDU guidelines for chest pain. At the time of disposition, the patient remained stable and without new complaints. Physical Examination      Physical Exam  Adult female, alert and in no distress. Heart regular rate and rhythm on monitor. Respirations even and unlabored.      Ania Osorio MD  01/11/23 [id number]

## 2023-01-11 NOTE — H&P
[facility] Emergency Department  EDObservation Admission Note   Emergency Physicians       Time Placed in ED Observation: DISPOSITION Ed Observation 01/11/2023 06:20:36 AM    Impression and Plan      In summary, Maribel Sims is admitted by to the [facility] Observation Unit for chest pain. Dr. Adwoa Fan is the observation admission attending. This patient has been risk-stratified based on the available history, physical exam, and related study findings. Admission to observation status for further diagnosis/treatment/monitoring of chest pain is warranted clinically. This extended period of observation is specifically required to determine the need for hospitalization. We will observe the patient for the following endpoints:    -Serial troponins  -Stress test in a.m. When met, appropriate disposition will be arranged. Diagnostic Evaluation      Diagnostic studies and ED interventions germane to this period of clinical observation will include:    Serial troponins  Stress test in a.m. Consultant(s)      None       Patient History      Maribel Sims is a 65 y.o. female who presented to the Emergency Department for evaluation of chest pain. The acute evaluation included EKG with nonspecific T wave inversions. Laboratory studies including negative troponin x2. Chest x-ray is unremarkable. Upon admission to the Observation Unit, Deborah Maurer chest pain is almost completely resolved.       Past Medical History  Past Medical History:   Diagnosis Date    Essential hypertension, benign 7/5/2011    GERD (gastroesophageal reflux disease) 4/22/2016    Hyperlipidemia 7/7/2011    Migraine     Osteoarthritis of right knee 7/7/2011    Pyriformis syndrome 12/31/2012    Screening mammogram for high-risk patient *June 11, 2019    Benign    Screening mammogram, encounter for *May 26, 2020     Past Surgical History:   Procedure Laterality Date    COLONOSCOPY  *May 13, 2016 (  )    Dr. Gricelda Mckinnon - diverticulosis, polyp ( colonoc mucosa )    ECTOPIC PREGNANCY SURGERY      ENDOMETRIAL ABLATION      HYSTERECTOMY (CERVIX STATUS UNKNOWN)      LUMBAR FUSION      Dr. Caitlin Ojeda  *May, 2016    Dr. Gricelda Mckinnon - erosive esophagitis    UPPER GASTROINTESTINAL ENDOSCOPY  *2019    Dr. Gricelda Mckinnon - gastric nodule ( biopsy benign )    UPPER GASTROINTESTINAL ENDOSCOPY N/A 2021    EGD ESOPHAGOGASTRODUODENOSCOPY ULTRASOUND performed by Cindy Aguilar MD at [facility] N/A 2021    EGD W/ EMR performed by Cindy Aguilar MD at [address] [place] [address]        Family History   Problem Relation Age of Onset    Ovarian Cancer Maternal Aunt     Cancer Maternal Grandmother     Cancer Paternal Grandfather     Other Father          - cause unknown     Diabetes Mother 80        Alive - DM, Hypertension, heart disease     Rheum Arthritis Neg Hx     Osteoarthritis Neg Hx     Asthma Neg Hx     Breast Cancer Neg Hx     Heart Failure Neg Hx     High Cholesterol Neg Hx     Hypertension Neg Hx     Migraines Neg Hx     Rashes/Skin Problems Neg Hx     Seizures Neg Hx     Stroke Neg Hx     Thyroid Disease Neg Hx      Social History     Tobacco Use    Smoking status: Former     Packs/day: 0.50     Years: 13.00     Pack years: 6.50     Types: Cigarettes     Quit date: 2000     Years since quittin.0    Smokeless tobacco: Never   Vaping Use    Vaping Use: Never used   Substance Use Topics    Alcohol use: Not Currently     Alcohol/week: 0.0 standard drinks    Drug use: No        Medications      Previous Medications    ATOGEPANT (QULIPTA) 30 MG TABS    Take 30 mg by mouth daily    ATOGEPANT (QULIPTA) 30 MG TABS    Take 30 mg by mouth daily    BUTALBITAL-APAP-CAFFEINE -40 MG CAPS PER CAPSULE    Take 1 capsule by mouth every 6 hours as needed for Headaches or Migraine ERENUMAB-AOOE (AIMOVIG) 70 MG/ML SOAJ    INJECT 1 PEN MONTHLY    METHOCARBAMOL (ROBAXIN) 750 MG TABLET    Take 750 mg by mouth every 6 hours as needed    NAPROXEN (NAPROSYN) 500 MG TABLET    TAKE 1 TABLET BY MOUTH TWICE DAILY ON A FULL STOMACH AS NEEDED FOR PAIN    OMEPRAZOLE (PRILOSEC) 20 MG DELAYED RELEASE CAPSULE    TAKE 1 CAPSULE BY MOUTH TWICE DAILY          Review of Systems      Review of Systems   Constitutional: Negative. HENT: Negative. Eyes: Negative. Respiratory: Negative. Cardiovascular:  Positive for chest pain. Gastrointestinal:  Positive for nausea. Negative for abdominal pain. Genitourinary: Negative. Musculoskeletal: Negative. All other systems reviewed and are negative. Physical Examination      Physical Exam  Vitals and nursing note reviewed. Constitutional:       General: She is not in acute distress. Appearance: She is obese. HENT:      Head: Normocephalic and atraumatic. Mouth/Throat:      Mouth: Mucous membranes are moist.      Pharynx: No oropharyngeal exudate. Eyes:      General: No scleral icterus. Extraocular Movements: Extraocular movements intact. Conjunctiva/sclera: Conjunctivae normal.      Pupils: Pupils are equal, round, and reactive to light. Cardiovascular:      Rate and Rhythm: Normal rate and regular rhythm. Heart sounds: Normal heart sounds. Pulmonary:      Breath sounds: Normal breath sounds. Abdominal:      General: Bowel sounds are normal.      Palpations: Abdomen is soft. Tenderness: There is no abdominal tenderness. There is no guarding. Musculoskeletal:         General: No swelling. Normal range of motion. Cervical back: Normal range of motion and neck supple. Skin:     General: Skin is warm and dry. Neurological:      General: No focal deficit present. Mental Status: She is alert and oriented to person, place, and time. Cranial Nerves: No cranial nerve deficit.       Motor: No weakness.       Coordination: Coordination normal.

## 2023-01-11 NOTE — ED NOTES
Call rec'd from cath lab. States pt will probably be taken for angiogram tonight.  Pt informed and voiced understanding      Atilio Ortiz RN  01/11/23 8724

## 2023-01-11 NOTE — CONSULTS
[address]   Cardiology Consult Note   Dr Gerry Gipson MD, Bay Siu RN, FNP APRN CVNP  Date: 1/11/2023  Admit Date: 1/11/2023       Reason for consultation: abnormal stress test   Nuclear stress test -abnormal with concerning LAD distribution. CC:cp    History obtained from patient and medical record. Primary cardiologist: Diamond Ortega      HPI: Loren Singer is a 65 y.o. female with a past medical history of remote tobacco use obesity admitted with cp /nausea started during HS last HS / once occurrence no c/o fever cough edema   Trop neg x 3 abnormal stress test   VSS c/o mid sternal to left arm at rest nitro helps   Discussed with Dr Jacquelin Conner to do LHC in am   LHC indicated, risks benefits & alternatives has been discussed   Start Lipitor 40 mg daily   Vit D 800 units daily   Metoprolol 25mg BID  Asa 81 mg daily   Hep gtt     Patient seen and examined. Clinical notes reviewed. Telemetry reviewed / Pertinent labs, diagnostic, device, and imaging results reviewed as a part of this visit  I spent a total of 35 minutes and greater than 50% of the time was spent counseling with patient  coordinating care regarding her diagnosis, treatments and plan of care. EKG TTE stress test: any LHC/RHC reviewed     Past Medical History:   Diagnosis Date    Essential hypertension, benign 7/5/2011    GERD (gastroesophageal reflux disease) 4/22/2016    Hyperlipidemia 7/7/2011    Migraine     Osteoarthritis of right knee 7/7/2011    Pyriformis syndrome 12/31/2012    Screening mammogram for high-risk patient *June 11, 2019    Benign    Screening mammogram, encounter for *May 26, 2020        Past Surgical History:    has a past surgical history that includes Hysterectomy; Endometrial ablation; Ectopic pregnancy surgery; lumbar fusion (2014); Upper gastrointestinal endoscopy (*May, 2016); Colonoscopy (*May 13, 2016 ( 2026 ));  Upper gastrointestinal endoscopy (*Sept.24, 2019); US Breast Fine Needle Aspiration; Ovary removal; Upper gastrointestinal endoscopy (N/A, 8/31/2021); and Upper gastrointestinal endoscopy (N/A, 8/31/2021). Social History:  Reviewed. reports that she quit smoking about 23 years ago. She has a 6.50 pack-year smoking history. She has never used smokeless tobacco. She reports that she does not currently use alcohol. She reports that she does not use drugs. Allergies: Allergies   Allergen Reactions    Penicillins Shortness Of Breath, Itching and Swelling       Blisters in the mouth and hives      Tramadol Other (See Comments)     Chest pain         Family History:  Reviewed. family history includes Cancer in her maternal grandmother and paternal grandfather; Diabetes (age of onset: 80) in her mother; Other in her father; Ovarian Cancer in her maternal aunt. Denies family history of sudden cardiac death, arrhythmia, premature CAD    Home Meds:  Prior to Visit Medications    Medication Sig Taking?  Authorizing Provider   methocarbamol (ROBAXIN) 750 MG tablet Take 750 mg by mouth every 6 hours as needed Yes Historical Provider, MD   butalbital-APAP-caffeine -40 MG CAPS per capsule Take 1 capsule by mouth every 6 hours as needed for Headaches or Migraine  ANTONETTE Carias CNP   Atogepant (QULIPTA) 30 MG TABS Take 30 mg by mouth daily  ANTONETTE Carias CNP   omeprazole (PRILOSEC) 20 MG delayed release capsule TAKE 1 CAPSULE BY MOUTH TWICE DAILY  ANTONETTE Carias CNP   naproxen (NAPROSYN) 500 MG tablet TAKE 1 TABLET BY MOUTH TWICE DAILY ON A FULL STOMACH AS NEEDED FOR PAIN  ANTONETTE Carias CNP   Erenumab-aooe (AIMOVIG) 70 MG/ML SOAJ INJECT 1 PEN MONTHLY  ANTONETTE Carias CNP   Atogepant (QULIPTA) 30 MG TABS Take 30 mg by mouth daily  ANTONETTE Sheffield CNP        Scheduled Meds:   sodium chloride flush  5-40 mL IntraVENous 2 times per day    [START ON 1/12/2023] aspirin  81 mg Oral Daily    Atogepant  30 mg Oral Daily Continuous Infusions:   sodium chloride       PRN Meds:nitroGLYCERIN, sodium chloride flush, sodium chloride     Review of Systems:  Constitutional: Negative for fever, night sweats, chills, weight changes  Skin: Negative for rash, pruritus, bleeding, blood clots, or bruising    HEENT: Negative for vision changes, ringing in the ears, dysphagia, or swollen lymph nodes  Respiratory: Reviewed in HPI  Cardiovascular: Reviewed in HPI  Gastrointestinal: Negative for abdominal pain, N/V/D, constipation, or black/tarry stools  Genito-Urinary: Negative for dysuria, incontinence, or hematuria  Musculoskeletal: Negative for joint swelling, muscle pain, or injuries  Neurological/Psych: Negative for confusion, seizures, headaches, or TIA-like symptoms. No anxiety or depression. Physical Examination:  Vitals:    01/11/23 0755   BP: (!) 148/97   Pulse: 86   Resp: 14   Temp:    SpO2: 95%      No intake/output data recorded. Wt Readings from Last 3 Encounters:   01/11/23 190 lb (86.2 kg)   09/06/22 188 lb (85.3 kg)   08/01/22 186 lb (84.4 kg)     No intake or output data in the 24 hours ending 01/11/23 1610    Telemetry: Personally Reviewed    Constitutional: Cooperative and in no apparent distress, and appears well nourished  Skin: Warm and pink; no pallor, cyanosis, clubbing, or bruising   HEENT: Symmetric and normocephalic. PERRL, EOM intact. Conjunctiva pink with clear sclera. Mucus membranes moist.   Cardiovascular: Regular rate and rhythm. S1/S2 present without murmurs, no rubs or gallops. Peripheral pulses 2+, capillary refill < 3 seconds. No elevation of JVP. No peripheral edema  Respiratory: Respirations symmetric and unlabored. Lungs clear to auscultation bilaterally, no wheezing, crackles, or rhonchi  Gastrointestinal: Abdomen soft and round. Bowel sounds normoactive without tenderness or masses.   Musculoskeletal: Bilateral upper and lower extremity strength 5/5 with full ROM  Neurologic/Psych: Awake and orientated to person, place and time. Calm affect, appropriate mood      BMP:   Recent Labs     01/11/23 0413   *   K 3.7      CO2 23   BUN 14   CREATININE 0.5*     Estimated Creatinine Clearance: 126 mL/min (A) (based on SCr of 0.5 mg/dL (L)).    CBC:   Recent Labs     01/11/23 0413   WBC 9.4   HGB 12.4   HCT 37.1   MCV 83.0        Thyroid:   Lab Results   Component Value Date/Time    TSH 1.18 07/12/2021 11:03 AM     Lipids:   Lab Results   Component Value Date/Time    CHOL 227 07/12/2021 11:03 AM    HDL 57 08/03/2022 08:04 AM    HDL 57 12/10/2010 11:21 AM    TRIG 122 07/12/2021 11:03 AM     LFTS:   Lab Results   Component Value Date/Time    ALT 12 01/11/2023 04:13 AM    AST 17 01/11/2023 04:13 AM    ALKPHOS 60 01/11/2023 04:13 AM    PROT 7.4 01/11/2023 04:13 AM    PROT 7.6 12/31/2012 10:12 AM    AGRATIO 1.4 08/03/2022 08:04 AM    BILITOT <0.2 01/11/2023 04:13 AM     Cardiac Enzymes:   Lab Results   Component Value Date/Time    CKTOTAL 158 09/06/2019 07:21 AM    TROPONINI <0.01 01/11/2023 11:22 AM    TROPONINI <0.01 01/11/2023 05:38 AM    TROPONINI <0.01 01/11/2023 04:13 AM     Patient Active Problem List    Diagnosis Date Noted    Chest pain 01/11/2023    Elevated glucose 09/09/2021    DDD (degenerative disc disease), lumbar 01/16/2012    Hyperlipidemia 07/07/2011    Intractable migraine 02/02/2007    Essential hypertension, benign 01/08/2007        Assessment     cp     HLD     Recommend statin   Recommend low fat diet      Tobacco use  Cessation recommended     Plan   Trop neg x 3 abnormal stress test   VSS c/o mid sternal to left arm at rest nitro helps   Discussed with Dr Adriano Napoles to do LHC in am   LHC indicated, risks benefits & alternatives has been discussed   Start Lipitor 40 mg daily   Vit D 800 units daily   Metoprolol 25mg BID  Asa 81 mg daily   Hep gtt   NPO 12 midnight       Thank you for allowing to us to participate in the care of Deborah BARNEY [place]. Hong Bachelor APRN-CNP-CVNP    [facility]     Interventional cardiology note  Patient is an  To the emergency department with chest pain and shortness of breath. Stress nuclear study was abnormal and positive. Patient does need a left heart cath. We will plan to have it performed tomorrow morning either myself or Dr. Yulia Muse will perform the cath. Intervention as necessary. Would she be on metoprolol and aspirin and Lipitor.   Claudia Coppola MD, [facility]

## 2023-01-11 NOTE — H&P
Hospital Medicine History & Physical      PCP: Olivier Raymond, APRN - CNP    Date of Admission: 1/11/2023    Date of Service: Pt seen/examined on 1/11/23 and Admitted to Inpatient     Chief Complaint:  chest pain      History Of Present Illness: This is a 81-year-old female with a past medical history of migraines, degenerative disc disease, sciatica presenting today with sudden onset chest pain that woke her up from sleep this morning. She took aspirin and Pepto-Bismol with no improvement in pain. She also complains of some right arm pain. Her chest pain is located over her last breastbone. 10/10 in intensity when it was worse. Pain relived with ntg. She has hx of smoking 5 Pack year. Quit 28 yrs ago. She had stress test today which is abnormal. She will be admitted for further care.     Past Medical History:        Diagnosis Date    Essential hypertension, benign 7/5/2011    GERD (gastroesophageal reflux disease) 4/22/2016    Hyperlipidemia 7/7/2011    Migraine     Osteoarthritis of right knee 7/7/2011    Pyriformis syndrome 12/31/2012    Screening mammogram for high-risk patient *June 11, 2019    Benign    Screening mammogram, encounter for *May 26, 2020       Past Surgical History:        Procedure Laterality Date    COLONOSCOPY  *May 13, 2016 ( 2026 )    Dr. Virginia Perez - diverticulosis, polyp ( colonoc mucosa )    ECTOPIC PREGNANCY SURGERY      ENDOMETRIAL ABLATION      HYSTERECTOMY (CERVIX STATUS UNKNOWN)      LUMBAR FUSION  2014    Dr. Yesenia Goodrich  *May, 2016    Dr. Virginia Perez - erosive esophagitis    UPPER GASTROINTESTINAL ENDOSCOPY  *Sept.24, 2019    Dr. Virginia Perez - gastric nodule ( biopsy benign )    UPPER GASTROINTESTINAL ENDOSCOPY N/A 8/31/2021    EGD ESOPHAGOGASTR[facility]    EGD W/ EMR performed by Zak Clark MD at [facility]          Medications Prior to Admission:    Prior to Admission medications    Medication Sig Start Date End Date Taking? Authorizing Provider   methocarbamol (ROBAXIN) 750 MG tablet Take 750 mg by mouth every 6 hours as needed 22  Yes Historical Provider, MD   butalbital-APAP-caffeine -40 MG CAPS per capsule Take 1 capsule by mouth every 6 hours as needed for Headaches or Migraine 22   Zelalemell See, APRN - CNP   Atogepant (QULIPTA) 30 MG TABS Take 30 mg by mouth daily 22   Claudell See, APRN - CNP   omeprazole (PRILOSEC) 20 MG delayed release capsule TAKE 1 CAPSULE BY MOUTH TWICE DAILY 22   Claudell See, APRN - CNP   naproxen (NAPROSYN) 500 MG tablet TAKE 1 TABLET BY MOUTH TWICE DAILY ON A FULL STOMACH AS NEEDED FOR PAIN 22   Claudell See, APRN - CNP   Erenumab-aooe (AIMOVIG) 79 MG/ML SOAJ INJECT 1 PEN MONTHLY 22   Claudell See, APRN - CNP   Atogepant (QULIPTA) 30 MG TABS Take 30 mg by mouth daily 22   Claudell See, APRN - CNP       Allergies:  Penicillins and Tramadol    Social History:  The patient currently lives at home    TOBACCO:   reports that she quit smoking about 23 years ago. She has a 6.50 pack-year smoking history. She has never used smokeless tobacco.  ETOH:   reports that she does not currently use alcohol. Family History:  Reviewed in detail and negative for DM, Early CAD, Cancer, CVA.  Positive as follows:        Problem Relation Age of Onset    Ovarian Cancer Maternal Aunt     Cancer Maternal Grandmother     Cancer Paternal Grandfather     Other Father          - cause unknown     Diabetes Mother 80        Alive - DM, Hypertension, heart disease     Rheum Arthritis Neg Hx     Osteoarthritis Neg Hx     Asthma Neg Hx     Breast Cancer Neg Hx     Heart Failure Neg Hx     High Cholesterol Neg Hx     Hypertension Neg Hx     Migraines Neg Hx     Rashes/Skin Problems Neg Hx     Seizures Neg Hx     Stroke Neg Hx     Thyroid Disease Neg Hx        REVIEW OF SYSTEMS:   Positive and negative  as noted in the HPI. All other systems reviewed and negative. PHYSICAL EXAM:    BP (!) 145/96   Pulse (!) 109   Temp 98.3 °F (36.8 °C) (Oral)   Resp 25   Ht 5' 4\" (1.626 m)   Wt 190 lb (86.2 kg)   SpO2 94%   BMI 32.61 kg/m²     General appearance: No apparent distress appears stated age and cooperative. HEENT Normal cephalic, atraumatic without obvious deformity. Pupils equal, round, and reactive to light. Extra ocular muscles intact. Conjunctivae/corneas clear. Neck: Supple, No jugular venous distention/bruits. Trachea midline without thyromegaly or adenopathy with full range of motion. Lungs: Clear to auscultation, bilaterally without Rales/Wheezes/Rhonchi with good respiratory effort. Heart: Regular rate and rhythm with Normal S1/S2 without murmurs, rubs or gallops, point of maximum impulse non-displaced  Abdomen: Soft, non-tender or non-distended without rigidity or guarding and positive bowel sounds all four quadrants. Extremities: No clubbing, cyanosis, or edema bilaterally. Full range of motion without deformity and normal gait intact. Skin: Skin color, texture, turgor normal.  No rashes or lesions. Neurologic: Alert and oriented X 3, neurovascularly intact with sensory/motor intact upper extremities/lower extremities, bilaterally. Cranial nerves: II-XII intact, grossly non-focal.  Mental status: Alert, oriented, thought content appropriate.   Capillary Refill: Acceptable  < 3 seconds  Peripheral Pulses: +3 Easily felt, not easily obliterated with pressure      CXR:  I have reviewed the CXR with the following interpretation:normla study  EKG:  I have reviewed the EKG with the following interpretation: ST changes in lateral leads    CBC   Recent Labs 01/11/23  0413 01/11/23  1816   WBC 9.4 5.9   HGB 12.4 12.1   HCT 37.1 37.3    169      RENAL  Recent Labs     01/11/23  0413 01/11/23  1816   * 135*   K 3.7 3.8    100   CO2 23 22   BUN 14 10   CREATININE 0.5* <0.5*     LFT'S  Recent Labs     01/11/23 0413   AST 17   ALT 12   BILIDIR <0.2   BILITOT <0.2   ALKPHOS 60     COAG  Recent Labs     01/11/23  1816   INR 1.11     CARDIAC ENZYMES  Recent Labs     01/11/23  1122 01/11/23  1638 01/11/23  1816   TROPONINI <0.01 <0.01 <0.01       U/A:    Lab Results   Component Value Date/Time    NITRITE neg 08/23/2019 05:09 PM    COLORU DK YELLOW 08/19/2020 07:31 AM    WBCUA 28 08/19/2020 07:31 AM    RBCUA 3 08/19/2020 07:31 AM    BACTERIA 1+ 08/19/2020 07:31 AM    CLARITYU TURBID 08/19/2020 07:31 AM    SPECGRAV >1.030 08/19/2020 07:31 AM    LEUKOCYTESUR Negative 08/19/2020 07:31 AM    BLOODU Negative 08/19/2020 07:31 AM    GLUCOSEU Negative 08/19/2020 07:31 AM       ABG  No results found for: KUV3XQB, BEART, C2JSEDIM, PHART, THGBART, BUM8IEX, PO2ART, JVJ0FIQ        Active Hospital Problems    Diagnosis Date Noted    Chest pain in adult [R07.9] 01/11/2023     Priority: Medium    Chest pain [R07.9] 04/22/2016         PHYSICIANS CERTIFICATION:    I certify that [address] is expected to be hospitalized for more than 2 midnights based on the following assessment and plan:      ASSESSMENT/PLAN:    Chest pain/ Abnormal stress test  Cardiology consulted  On heparin drip  Planning for [facility] tomorrow  Trend trop  Continue statin, BB    HTN:- uncontrolled  Continue home meds    Hx of recent back surgery 3 months ago  Pain control          DVT Prophylaxis: scd  Diet: Diet NPO Exceptions are: Sips of Water with Meds  ADULT DIET;  Regular; Low Fat/Low Chol/High Fiber/CECILLE  Code Status: Full Code  PT/OT Eval Status: n/a     Dispo - inpatient , pending workup       Flaquita Blanton MD    Thank you ANTONETTE Zarate - WOODY for the opportunity to be involved in this patient's care. If you have any questions or concerns please feel free to contact me at [phone].

## 2023-01-12 ENCOUNTER — APPOINTMENT (OUTPATIENT)
Dept: CARDIAC CATH/INVASIVE PROCEDURES | Age: 63
DRG: 246 | End: 2023-01-12
Payer: COMMERCIAL

## 2023-01-12 LAB
ANTI-XA UNFRAC HEPARIN: <0.1 IU/ML (ref 0.3–0.7)
ANTI-XA UNFRAC HEPARIN: <0.1 IU/ML (ref 0.3–0.7)
EKG ATRIAL RATE: 115 BPM
EKG DIAGNOSIS: NORMAL
EKG P AXIS: 71 DEGREES
EKG P-R INTERVAL: 164 MS
EKG Q-T INTERVAL: 336 MS
EKG QRS DURATION: 88 MS
EKG QTC CALCULATION (BAZETT): 464 MS
EKG R AXIS: -21 DEGREES
EKG T AXIS: -22 DEGREES
EKG VENTRICULAR RATE: 115 BPM
HCT VFR BLD CALC: 38 % (ref 36–48)
HEMOGLOBIN: 12.4 G/DL (ref 12–16)
INFLUENZA A: NOT DETECTED
INFLUENZA B: NOT DETECTED
LEFT VENTRICULAR EJECTION FRACTION MODE: NORMAL
LV EF: 50 %
MCH RBC QN AUTO: 27.7 PG (ref 26–34)
MCHC RBC AUTO-ENTMCNC: 32.7 G/DL (ref 31–36)
MCV RBC AUTO: 84.8 FL (ref 80–100)
PDW BLD-RTO: 14.7 % (ref 12.4–15.4)
PLATELET # BLD: 169 K/UL (ref 135–450)
PMV BLD AUTO: 9.6 FL (ref 5–10.5)
POC ACT LR: 190 SEC
POC ACT LR: 240 SEC
RBC # BLD: 4.49 M/UL (ref 4–5.2)
SARS-COV-2 RNA, RT PCR: DETECTED
WBC # BLD: 4.3 K/UL (ref 4–11)

## 2023-01-12 PROCEDURE — 1200000000 HC SEMI PRIVATE

## 2023-01-12 PROCEDURE — 6360000002 HC RX W HCPCS: Performed by: INTERNAL MEDICINE

## 2023-01-12 PROCEDURE — 99152 MOD SED SAME PHYS/QHP 5/>YRS: CPT | Performed by: INTERNAL MEDICINE

## 2023-01-12 PROCEDURE — B2111ZZ FLUOROSCOPY OF MULTIPLE CORONARY ARTERIES USING LOW OSMOLAR CONTRAST: ICD-10-PCS | Performed by: INTERNAL MEDICINE

## 2023-01-12 PROCEDURE — 36415 COLL VENOUS BLD VENIPUNCTURE: CPT

## 2023-01-12 PROCEDURE — 92928 PRQ TCAT PLMT NTRAC ST 1 LES: CPT

## 2023-01-12 PROCEDURE — 93005 ELECTROCARDIOGRAM TRACING: CPT | Performed by: INTERNAL MEDICINE

## 2023-01-12 PROCEDURE — C1887 CATHETER, GUIDING: HCPCS

## 2023-01-12 PROCEDURE — 6370000000 HC RX 637 (ALT 250 FOR IP): Performed by: INTERNAL MEDICINE

## 2023-01-12 PROCEDURE — C1894 INTRO/SHEATH, NON-LASER: HCPCS

## 2023-01-12 PROCEDURE — 99153 MOD SED SAME PHYS/QHP EA: CPT

## 2023-01-12 PROCEDURE — 93458 L HRT ARTERY/VENTRICLE ANGIO: CPT | Performed by: INTERNAL MEDICINE

## 2023-01-12 PROCEDURE — C1874 STENT, COATED/COV W/DEL SYS: HCPCS

## 2023-01-12 PROCEDURE — 4A023N7 MEASUREMENT OF CARDIAC SAMPLING AND PRESSURE, LEFT HEART, PERCUTANEOUS APPROACH: ICD-10-PCS | Performed by: INTERNAL MEDICINE

## 2023-01-12 PROCEDURE — 93010 ELECTROCARDIOGRAM REPORT: CPT | Performed by: INTERNAL MEDICINE

## 2023-01-12 PROCEDURE — 93458 L HRT ARTERY/VENTRICLE ANGIO: CPT

## 2023-01-12 PROCEDURE — 85347 COAGULATION TIME ACTIVATED: CPT

## 2023-01-12 PROCEDURE — 6360000002 HC RX W HCPCS

## 2023-01-12 PROCEDURE — 99152 MOD SED SAME PHYS/QHP 5/>YRS: CPT

## 2023-01-12 PROCEDURE — 2709999900 HC NON-CHARGEABLE SUPPLY

## 2023-01-12 PROCEDURE — 80061 LIPID PANEL: CPT

## 2023-01-12 PROCEDURE — 6370000000 HC RX 637 (ALT 250 FOR IP): Performed by: NURSE PRACTITIONER

## 2023-01-12 PROCEDURE — 87636 SARSCOV2 & INF A&B AMP PRB: CPT

## 2023-01-12 PROCEDURE — 2580000003 HC RX 258: Performed by: INTERNAL MEDICINE

## 2023-01-12 PROCEDURE — 2500000003 HC RX 250 WO HCPCS

## 2023-01-12 PROCEDURE — C1769 GUIDE WIRE: HCPCS

## 2023-01-12 PROCEDURE — 85027 COMPLETE CBC AUTOMATED: CPT

## 2023-01-12 PROCEDURE — 92928 PRQ TCAT PLMT NTRAC ST 1 LES: CPT | Performed by: INTERNAL MEDICINE

## 2023-01-12 PROCEDURE — 027034Z DILATION OF CORONARY ARTERY, ONE ARTERY WITH DRUG-ELUTING INTRALUMINAL DEVICE, PERCUTANEOUS APPROACH: ICD-10-PCS | Performed by: INTERNAL MEDICINE

## 2023-01-12 PROCEDURE — 6370000000 HC RX 637 (ALT 250 FOR IP)

## 2023-01-12 PROCEDURE — 85520 HEPARIN ASSAY: CPT

## 2023-01-12 RX ORDER — METHOCARBAMOL 500 MG/1
750 TABLET, FILM COATED ORAL EVERY 6 HOURS PRN
Status: DISCONTINUED | OUTPATIENT
Start: 2023-01-12 | End: 2023-01-13 | Stop reason: HOSPADM

## 2023-01-12 RX ORDER — SODIUM CHLORIDE 9 MG/ML
INJECTION, SOLUTION INTRAVENOUS PRN
Status: DISCONTINUED | OUTPATIENT
Start: 2023-01-12 | End: 2023-01-13 | Stop reason: HOSPADM

## 2023-01-12 RX ORDER — NITROGLYCERIN 0.4 MG/1
0.4 TABLET SUBLINGUAL EVERY 5 MIN PRN
Status: DISCONTINUED | OUTPATIENT
Start: 2023-01-12 | End: 2023-01-13 | Stop reason: HOSPADM

## 2023-01-12 RX ORDER — MORPHINE SULFATE 2 MG/ML
2 INJECTION, SOLUTION INTRAMUSCULAR; INTRAVENOUS
Status: ACTIVE | OUTPATIENT
Start: 2023-01-12 | End: 2023-01-13

## 2023-01-12 RX ORDER — SODIUM CHLORIDE 0.9 % (FLUSH) 0.9 %
5-40 SYRINGE (ML) INJECTION PRN
Status: DISCONTINUED | OUTPATIENT
Start: 2023-01-12 | End: 2023-01-13 | Stop reason: HOSPADM

## 2023-01-12 RX ORDER — OXYCODONE HYDROCHLORIDE 5 MG/1
5 TABLET ORAL EVERY 4 HOURS PRN
Status: DISCONTINUED | OUTPATIENT
Start: 2023-01-12 | End: 2023-01-13 | Stop reason: HOSPADM

## 2023-01-12 RX ORDER — ACETAMINOPHEN 500 MG
1000 TABLET ORAL ONCE
Status: COMPLETED | OUTPATIENT
Start: 2023-01-12 | End: 2023-01-12

## 2023-01-12 RX ORDER — ATORVASTATIN CALCIUM 80 MG/1
80 TABLET, FILM COATED ORAL NIGHTLY
Status: DISCONTINUED | OUTPATIENT
Start: 2023-01-12 | End: 2023-01-13 | Stop reason: HOSPADM

## 2023-01-12 RX ORDER — EPTIFIBATIDE 0.75 MG/ML
2 INJECTION, SOLUTION INTRAVENOUS CONTINUOUS
Status: DISPENSED | OUTPATIENT
Start: 2023-01-12 | End: 2023-01-12

## 2023-01-12 RX ORDER — ATROPINE SULFATE 0.4 MG/ML
0.5 AMPUL (ML) INJECTION
Status: DISPENSED | OUTPATIENT
Start: 2023-01-12 | End: 2023-01-13

## 2023-01-12 RX ORDER — ONDANSETRON 2 MG/ML
4 INJECTION INTRAMUSCULAR; INTRAVENOUS EVERY 6 HOURS PRN
Status: DISCONTINUED | OUTPATIENT
Start: 2023-01-12 | End: 2023-01-12 | Stop reason: SDUPTHER

## 2023-01-12 RX ORDER — SODIUM CHLORIDE 0.9 % (FLUSH) 0.9 %
5-40 SYRINGE (ML) INJECTION EVERY 12 HOURS SCHEDULED
Status: DISCONTINUED | OUTPATIENT
Start: 2023-01-12 | End: 2023-01-13 | Stop reason: HOSPADM

## 2023-01-12 RX ORDER — ASPIRIN 81 MG/1
81 TABLET, CHEWABLE ORAL DAILY
Status: DISCONTINUED | OUTPATIENT
Start: 2023-01-13 | End: 2023-01-13 | Stop reason: HOSPADM

## 2023-01-12 RX ORDER — CLOPIDOGREL BISULFATE 75 MG/1
75 TABLET ORAL DAILY
Status: DISCONTINUED | OUTPATIENT
Start: 2023-01-13 | End: 2023-01-13 | Stop reason: HOSPADM

## 2023-01-12 RX ORDER — SODIUM CHLORIDE 9 MG/ML
INJECTION, SOLUTION INTRAVENOUS CONTINUOUS
Status: ACTIVE | OUTPATIENT
Start: 2023-01-12 | End: 2023-01-12

## 2023-01-12 RX ORDER — ACETAMINOPHEN 325 MG/1
650 TABLET ORAL EVERY 4 HOURS PRN
Status: DISCONTINUED | OUTPATIENT
Start: 2023-01-12 | End: 2023-01-12 | Stop reason: SDUPTHER

## 2023-01-12 RX ADMIN — METHOCARBAMOL 750 MG: 500 TABLET ORAL at 00:13

## 2023-01-12 RX ADMIN — SODIUM CHLORIDE: 9 INJECTION, SOLUTION INTRAVENOUS at 12:00

## 2023-01-12 RX ADMIN — METHOCARBAMOL 750 MG: 500 TABLET ORAL at 15:12

## 2023-01-12 RX ADMIN — NITROGLYCERIN 0.4 MG: 0.4 TABLET, ORALLY DISINTEGRATING SUBLINGUAL at 06:46

## 2023-01-12 RX ADMIN — ATORVASTATIN CALCIUM 80 MG: 80 TABLET, FILM COATED ORAL at 20:36

## 2023-01-12 RX ADMIN — ACETAMINOPHEN 650 MG: 325 TABLET ORAL at 20:43

## 2023-01-12 RX ADMIN — ACETAMINOPHEN 650 MG: 325 TABLET ORAL at 07:32

## 2023-01-12 RX ADMIN — ACETAMINOPHEN 1000 MG: 500 TABLET ORAL at 13:13

## 2023-01-12 RX ADMIN — HEPARIN SODIUM 4000 UNITS: 1000 INJECTION INTRAVENOUS; SUBCUTANEOUS at 02:03

## 2023-01-12 RX ADMIN — NITROGLYCERIN 0.4 MG: 0.4 TABLET, ORALLY DISINTEGRATING SUBLINGUAL at 06:37

## 2023-01-12 RX ADMIN — EPTIFIBATIDE 2 MCG/KG/MIN: 0.75 INJECTION, SOLUTION INTRAVENOUS at 11:00

## 2023-01-12 RX ADMIN — METOPROLOL TARTRATE 25 MG: 25 TABLET, FILM COATED ORAL at 20:36

## 2023-01-12 ASSESSMENT — PAIN SCALES - GENERAL
PAINLEVEL_OUTOF10: 1
PAINLEVEL_OUTOF10: 2
PAINLEVEL_OUTOF10: 10
PAINLEVEL_OUTOF10: 0
PAINLEVEL_OUTOF10: 2
PAINLEVEL_OUTOF10: 0
PAINLEVEL_OUTOF10: 4
PAINLEVEL_OUTOF10: 6
PAINLEVEL_OUTOF10: 0
PAINLEVEL_OUTOF10: 10
PAINLEVEL_OUTOF10: 7
PAINLEVEL_OUTOF10: 2
PAINLEVEL_OUTOF10: 1
PAINLEVEL_OUTOF10: 2
PAINLEVEL_OUTOF10: 7

## 2023-01-12 ASSESSMENT — PAIN DESCRIPTION - ONSET
ONSET: ON-GOING

## 2023-01-12 ASSESSMENT — PAIN DESCRIPTION - ORIENTATION
ORIENTATION: MID
ORIENTATION: MID
ORIENTATION: LOWER
ORIENTATION: MID

## 2023-01-12 ASSESSMENT — PAIN DESCRIPTION - FREQUENCY
FREQUENCY: INTERMITTENT
FREQUENCY: CONTINUOUS
FREQUENCY: INTERMITTENT
FREQUENCY: INTERMITTENT

## 2023-01-12 ASSESSMENT — PAIN DESCRIPTION - LOCATION
LOCATION: HEAD
LOCATION: CHEST;BACK
LOCATION: CHEST
LOCATION: BACK
LOCATION: HEAD
LOCATION: CHEST
LOCATION: CHEST;BACK

## 2023-01-12 ASSESSMENT — PAIN DESCRIPTION - DESCRIPTORS
DESCRIPTORS: ACHING
DESCRIPTORS: PRESSURE;CRAMPING
DESCRIPTORS: ACHING
DESCRIPTORS: ACHING
DESCRIPTORS: PRESSURE

## 2023-01-12 ASSESSMENT — PAIN DESCRIPTION - PAIN TYPE
TYPE: ACUTE PAIN

## 2023-01-12 ASSESSMENT — PAIN - FUNCTIONAL ASSESSMENT
PAIN_FUNCTIONAL_ASSESSMENT: ACTIVITIES ARE NOT PREVENTED

## 2023-01-12 ASSESSMENT — PAIN DESCRIPTION - DIRECTION
RADIATING_TOWARDS: BACK
RADIATING_TOWARDS: ARM

## 2023-01-12 NOTE — PROGRESS NOTES
4 Eyes Skin Assessment     NAME:  Deborah Maurer  YOB: 1960  MEDICAL RECORD NUMBER:  [id number]    The patient is being assessed for  Admission    I agree that One RN have performed a thorough Head to Toe Skin Assessment on the patient. ALL assessment sites listed below have been assessed. Areas assessed by both nurses:    Head, Face, Ears, Shoulders, Back, Chest, Arms, Elbows, Hands, Sacrum. Buttock, Coccyx, Ischium, and Legs. Feet and Heels        Does the Patient have a Wound?  No noted wound(s)       Jon Prevention initiated by RN: No   Wound Care Orders initiated by RN: No    Pressure Injury (Stage 3,4, Unstageable, DTI, NWPT, and Complex wounds) if present place referral order by RN under : No    New and Established Ostomies, if present place, referral order under : No      Nurse 1 eSignature: Electronically signed by Veronica Alcantara RN on 1/12/23 at 2:15 AM EST    **SHARE this note so that the co-signing nurse is able to place an eSignature**    Nurse 2 eSignature: Electronically signed by Haja Tello RN on 1/12/23 at 3:34 AM EST

## 2023-01-12 NOTE — PROGRESS NOTES
Hospitalist Progress Note      PCP: ANTONETTE Mesa - CNP    Date of Admission: 1/11/2023    HPI:  This is a 70-year-old female with a past medical history of migraines, degenerative disc disease, sciatica presenting today with sudden onset chest pain that woke her up from sleep this morning. She took aspirin and Pepto-Bismol with no improvement in pain. She also complains of some right arm pain. Her chest pain is located over her last breastbone. 10/10 in intensity when it was worse. Pain relived with ntg. She has hx of smoking 5 Pack year. Quit 28 yrs ago. She had stress test today which is abnormal. She will be admitted for further care. Subjective:    Pt seen after her left heart cath. Findings per cardiology:  Left main normal  LAD normal diagonal branches septal perforators normal.  Left circumflex and obtuse marginal branches all normal  RCA dominant 75 to 80% mid vessel stenosis. Left ventricle LVEF 50% LVEDP 11 mmHg no gradient. PCI:     Heparin IV. MAC guide BMW wire. 3.0 x 15 mm [place] frontier SHARON deployed at 12 alexsandra x 20 seconds. LULA-3 flow present with nearly 0% residual stenosis and procedure was completed. Plan:     Hydration with 0.9 NS at 125 mL/h x 7 hours  Integrilin drip 8 hours. 600 mg Plavix given on table 75 mg daily starting tomorrow with 81 mg aspirin daily starting tomorrow. Protocol treatment of right radial access site. TR band has been applied. Estimated blood loss less than     Pt reports no further chest pain. She tested positive for covid but states her only sx is a headache. No cough or sob. No fever.   No n/v.    Medications:  Reviewed    Infusion Medications    sodium chloride      sodium chloride      sodium chloride      heparin (PORCINE) Infusion 15 Units/kg/hr (01/12/23 0210)     Scheduled Medications    sodium chloride flush  5-40 mL IntraVENous 2 times per day    sodium chloride flush  5-40 mL IntraVENous 2 times per day    aspirin  81 mg Oral Daily    metoprolol tartrate  25 mg Oral BID    atorvastatin  40 mg Oral Nightly    Vitamin D  1,000 Units Oral Daily    sodium chloride flush  5-40 mL IntraVENous 2 times per day     PRN Meds: methocarbamol, nitroGLYCERIN, sodium chloride flush, sodium chloride, sodium chloride flush, sodium chloride, ondansetron **OR** ondansetron, acetaminophen **OR** acetaminophen, polyethylene glycol, sodium chloride flush, sodium chloride, heparin (porcine), heparin (porcine)      Intake/Output Summary (Last 24 hours) at 1/12/2023 1013  Last data filed at 1/12/2023 0516  Gross per 24 hour   Intake 153.7 ml   Output --   Net 153.7 ml       Physical Exam Performed:    /80   Pulse (!) 114   Temp 100.2 °F (37.9 °C) (Oral)   Resp 18   Ht 5' 4\" (1.626 m)   Wt 195 lb 8.8 oz (88.7 kg)   SpO2 95%   BMI 33.57 kg/m²     General appearance: No apparent distress  Respiratory:  Normal respiratory effort. Clear to auscultation, bilaterally without Rales/Wheezes/Rhonchi. Cardiovascular: Regular rate and rhythm with normal S1/S2 without murmurs, rubs or gallops. Abdomen: Soft, non-tender  Musculoskeletal: rt ext dressing where lhc was done- no bleeding noted  Skin: No rashes or lesions. Neurologic:  Neurovascularly intact without any focal sensory/motor deficits.  Grossly non-focal.  Psychiatric: Alert and oriented, thought content appropriate, normal insight    Labs:   Recent Labs     01/11/23  0413 01/11/23  1816   WBC 9.4 5.9   HGB 12.4 12.1   HCT 37.1 37.3    169     Recent Labs     01/11/23  0413 01/11/23  1816   * 135*   K 3.7 3.8    100   CO2 23 22   BUN 14 10   CREATININE 0.5* <0.5*   CALCIUM 9.1 9.1     Recent Labs     01/11/23 0413   AST 17   ALT 12   BILIDIR <0.2   BILITOT <0.2   ALKPHOS 60     Recent Labs     01/11/23  1816   INR 1.11     Recent Labs     01/11/23  1122 01/11/23  1638 01/11/23  1816   TROPONINI <0.01 <0.01 <0.01       Urinalysis:      Lab Results   Component Value Date/Time NITRU Negative 08/19/2020 07:31 AM    WBCUA 28 08/19/2020 07:31 AM    BACTERIA 1+ 08/19/2020 07:31 AM    RBCUA 3 08/19/2020 07:31 AM    BLOODU Negative 08/19/2020 07:31 AM    SPECGRAV >1.030 08/19/2020 07:31 AM    GLUCOSEU Negative 08/19/2020 07:31 AM       Radiology:  NM Cardiac Stress Test Nuclear Imaging   Final Result      XR CHEST (2 VW)   Final Result      Clear lungs. Normal cardiomediastinal silhouette. Lumbar spine fixation hardware incompletely visualized. IP CONSULT TO CARDIOLOGY  IP CONSULT TO HOSPITALIST    Assessment/Plan:    Unstable angina:  Presented w/ chest pain. EKG showing lvh w/ reciprocol changes and unchanged from prior. Troponin negative. Stress test done on admission which showed small area of reverible ischemia. LHC showing rca stenosis now s/p pci and stent placement. Inetgrellin to cont x 8 hours per cards. Started on plavix. Cont asa/statin/ bb. Covid 19:  Cont supportive care. Tyelnol for headache. DVT Prophylaxis: Heparin gtt- dc'd after cath    Diet: Diet NPO Exceptions are: Sips of Water with Meds  Code Status: Full Code  PT/OT Eval Status: Not needed    Dispo - Inpatient- home if cath negative. If needs intervention likely 1-2 days.       Jagruti Marquez MD

## 2023-01-12 NOTE — PROGRESS NOTES
Pt c/o midsternal chest pain 6/10. NTG  SL X1 given. Approx 5 min later pt stated she thinks it's more her back and she takes a muscle relaxant at home.  Perfect serve message sent to hospitalist.

## 2023-01-12 NOTE — PROGRESS NOTES
Patient A/O x4. Patient has been off the floor for most of the shift, getting a Left Heart Catheretization in the Cath Lab. Patient returned to floor at 1500. Patient remains independent in the room. Patient is COVID+. Patient's heparin drip was discontinued and patient's Integrelin drip has completed. Radial site clean with no bleeding noticed at this time. Patient did complain of headache which was relieved with PRN tylenol. No other issues noted this shift.

## 2023-01-12 NOTE — PROGRESS NOTES
Interventional cardiology:    Cardiac cath:    Start time 1011 end time 10:56 AM    Patient examined and interviewed and consented prior to procedure. Mallampati score 2 ASA score 2. Fever to 100.2. COVID rapid test positive. Patient has no toxic syndrome related to COVID and is oxygenating normally    1.5 mg Versed IV push 75 mcg fentanyl IV push given in divided doses by my order. Left main normal  LAD normal diagonal branches septal perforators normal.  Left circumflex and obtuse marginal branches all normal  RCA dominant 75 to 80% mid vessel stenosis. Left ventricle LVEF 50% LVEDP 11 mmHg no gradient. PCI:    Heparin IV. MAC guide BMW wire. 3.0 x 15 mm [place] frontier SHARON deployed at 12 alexsandra x 20 seconds. LULA-3 flow present with nearly 0% residual stenosis and procedure was completed. Plan:    Hydration with 0.9 NS at 125 mL/h x 7 hours  Integrilin drip 8 hours. 600 mg Plavix given on table 75 mg daily starting tomorrow with 81 mg aspirin daily starting tomorrow. Protocol treatment of right radial access site. TR band has been applied. Estimated blood loss less than 20 mL. Total contrast 130 mL. No complications noted.

## 2023-01-12 NOTE — CARE COORDINATION
CM  following for  d/c planning:    Covid (+)  Droplet  Isolation: LHC planned for today   Dispo - Inpatient- home if cath negative. If needs intervention likely 1-2 days. Electronically signed by Oliver Sánchez RN on 1/12/2023 at 5:27 PM       Oliver Sánchez  RN Case Manager  [facility]  [address] [place] [address].   [facility] [address] [phone]  Fax [phone]

## 2023-01-12 NOTE — PLAN OF CARE
Problem: Pain  Goal: Verbalizes/displays adequate comfort level or baseline comfort level  1/12/2023 0826 by [place] RN  Outcome: Progressing  Flowsheets (Taken 1/12/2023 0516 by Yeni Cuevas RN)  Verbalizes/displays adequate comfort level or baseline comfort level: Encourage patient to monitor pain and request assistance  Note: Pain level assessed and PRN pain medication made available when needed for breakthrough pain  1/12/2023 0217 by Yeni Cuevas RN  Outcome: Progressing  Flowsheets (Taken 1/12/2023 0116)  Verbalizes/displays adequate comfort level or baseline comfort level: Encourage patient to monitor pain and request assistance     Problem: Safety - Adult  Goal: Free from fall injury  1/12/2023 0826 by [place] RN  Outcome: Progressing  Flowsheets (Taken 1/12/2023 0826)  Free From Fall Injury:   Instruct family/caregiver on patient safety   Based on caregiver fall risk screen, instruct family/caregiver to ask for assistance with transferring infant if caregiver noted to have fall risk factors  Note: Patient's room free of clutter and patient educated on proper call-light usage when needed.   1/12/2023 0217 by Yeni Cuevas RN  Outcome: Progressing

## 2023-01-12 NOTE — PROGRESS NOTES
Patient admitted to 6302 from ED. A&Ox4.  RA, sat 97%. Lungs clear. Skin intact. Heparin gtt infusing per right AC PIV as ordered. UAL to bathroom. Gait steady, no alarm needed. No needs at this time. Patient resting comfortably in bed. Call light in reach. Will continue to monitor.    Electronically signed by Margarita López RN on 1/12/2023 at 2:14 AM

## 2023-01-12 NOTE — PROGRESS NOTES
Report given to Javad Hamilton RN. Answered all questions and concerns. Bedside handoff of heparin gtt performed, see MAR. Will transport pt to floor when room clean.

## 2023-01-12 NOTE — PROCEDURES
[address]               [address]                            CARDIAC CATHETERIZATION    PATIENT NAME: Alexandra Pinto                :        1960  MED REC NO:   [id number]                          ROOM:       6302  ACCOUNT NO:   [de-identified]                           ADMIT DATE: 2023  PROVIDER:     Batsheva Santoro. Jeanette Lara MD    DATE OF PROCEDURE:  2023    PROCEDURE:  Cardiac catheterization, percutaneous coronary intervention,  left ventriculography. INDICATIONS FOR PROCEDURE:  The patient had abnormal nuclear cardiac  scan with chest pains. She just tested positive for COVID because she  has a mild fever of 100.2. She had typical anginal chest pain, class  III angina. The nuclear scan was positive for _____ apical ischemia. The Mallampati score was II, the ASA score was II. This was assessed  prior to the cardiac catheterization in the holding bay as I was getting  consent and examining the patient. DESCRIPTION OF THE PROCEDURE:  Start time of procedure was 10:11 a.m. The end time of procedure was 10:56 a.m. The patient received 1.5 mg of  Versed IV push in divided doses by my order and 75 mcg of fentanyl IV  push by my order in divided doses. These medications were given by the  qualified independent observer, Ruthann Dunne RN and the entire cardiac team  including myself monitored the patient's cardiopulmonary status for the  entire procedure. Using the ultrasound guidance, a 5/6 Slender sheath was advanced over  the micropuncture guidewire into the right radial artery. It was  aspirated and flushed, and usual cocktail of heparin, verapamil and  nitroglycerin was given through the sideport of that sheath. A Wholey  wire was advanced into the ascending aorta over a Wholey wire. Wire was  advanced into the ascending aorta over JR-4 catheter, and this gained  access to the ascending aorta.   The wire slipped into the left ventricle  as did the catheter, so the LVEDP was measured at 12 mmHg. Hand  injection [place] projection shows LV ejection fraction 50%. No gross  regional wall motion abnormality was seen. No mitral regurgitation was  seen. No gradient upon pullback from left ventricle, ascending aorta. I engaged the right coronary artery. It is tortuous dominant right  coronary artery. The mid right coronary artery near an RV branch and  near a bend has a 75 to 80% hazy stenosis. There was LULA grade 3 flow. The right posterior descending is normal.  The right posterolateral  branch is normal.  The RV branch is normal that is right after this 75  to 80% narrowing. The JR-4 catheter was wired out over an exchange length 0.035 J-wire and  the JL-3.5 diagnostic catheter was advanced into the ascending aorta and  was used to engage the left main coronary artery. Injection of multiple  obliquity showed that the left main coronary artery was normal.  The  left circumflex coronary artery was normal.  Obtuse marginal branches  were normal.  No obstructive disease identified. The left anterior  descending coronary artery was normal.  No obstructive disease was  identified. Diagonal branches and septal  branches were all  normal.  The LAD traversed the apex of left ventricle without any  obstruction. It was elected to proceed with PCI upon the mid right coronary stenosis  of 75 to 80%. A 3DRC guide would not engage the right coronary artery  ostium. I used a MAC guide with a BMW wire. I had some difficulty  wiring this, but I was able to primarily stent with a 3.0 x 15 mm length  [place] drug-eluting stent. This stent was deployed at nominal  pressure of 12 for 20 seconds. LULA-3 blood flow was present. The 80%  stenosis was reduced to 0. There was no dissection or dye  extravasation. The patient tolerated the procedure well, and the  angioplasty hardware was removed.   Before the start of the angioplasty,  the ACT was 190 seconds. I gave 3000 units of heparin. The ACT at the  conclusion of the procedure was 240 seconds. Integrilin single bolus  and drip was started. Plavix 600 mg was given. The sheath was removed,  and the TR band was applied per protocol. The sheath aspirated and  flushed freely. The patient tolerated the procedure well. There were  no complications noted. The estimated blood loss was less than 20 mL. Total contrast used was 125.         Myla Hanley MD    D: 01/12/2023 11:17:40       T: 01/12/2023 11:21:03     [id number]  Job#: [id number]     Doc#: [id number]    CC:  Liss Silva MD

## 2023-01-13 VITALS
TEMPERATURE: 98.7 F | RESPIRATION RATE: 16 BRPM | HEART RATE: 84 BPM | BODY MASS INDEX: 33.38 KG/M2 | WEIGHT: 195.55 LBS | SYSTOLIC BLOOD PRESSURE: 148 MMHG | DIASTOLIC BLOOD PRESSURE: 78 MMHG | HEIGHT: 64 IN | OXYGEN SATURATION: 95 %

## 2023-01-13 LAB
ANION GAP SERPL CALCULATED.3IONS-SCNC: 13 MMOL/L (ref 3–16)
BUN BLDV-MCNC: 12 MG/DL (ref 7–20)
CALCIUM SERPL-MCNC: 9 MG/DL (ref 8.3–10.6)
CHLORIDE BLD-SCNC: 105 MMOL/L (ref 99–110)
CHOLESTEROL, TOTAL: 227 MG/DL (ref 0–199)
CO2: 22 MMOL/L (ref 21–32)
CREAT SERPL-MCNC: 0.6 MG/DL (ref 0.6–1.2)
EKG ATRIAL RATE: 93 BPM
EKG DIAGNOSIS: NORMAL
EKG P AXIS: 71 DEGREES
EKG P-R INTERVAL: 174 MS
EKG Q-T INTERVAL: 378 MS
EKG QRS DURATION: 88 MS
EKG QTC CALCULATION (BAZETT): 469 MS
EKG R AXIS: 16 DEGREES
EKG T AXIS: -84 DEGREES
EKG VENTRICULAR RATE: 93 BPM
GFR SERPL CREATININE-BSD FRML MDRD: >60 ML/MIN/{1.73_M2}
GLUCOSE BLD-MCNC: 107 MG/DL (ref 70–99)
HCT VFR BLD CALC: 38.5 % (ref 36–48)
HDLC SERPL-MCNC: 52 MG/DL (ref 40–60)
HEMOGLOBIN: 12.5 G/DL (ref 12–16)
LDL CHOLESTEROL CALCULATED: 146 MG/DL
MCH RBC QN AUTO: 27.6 PG (ref 26–34)
MCHC RBC AUTO-ENTMCNC: 32.4 G/DL (ref 31–36)
MCV RBC AUTO: 85.2 FL (ref 80–100)
PDW BLD-RTO: 14.8 % (ref 12.4–15.4)
PLATELET # BLD: 197 K/UL (ref 135–450)
PMV BLD AUTO: 10 FL (ref 5–10.5)
POTASSIUM SERPL-SCNC: 4.2 MMOL/L (ref 3.5–5.1)
RBC # BLD: 4.52 M/UL (ref 4–5.2)
SODIUM BLD-SCNC: 140 MMOL/L (ref 136–145)
TRIGL SERPL-MCNC: 144 MG/DL (ref 0–150)
VLDLC SERPL CALC-MCNC: 29 MG/DL
WBC # BLD: 4.6 K/UL (ref 4–11)

## 2023-01-13 PROCEDURE — 6370000000 HC RX 637 (ALT 250 FOR IP): Performed by: INTERNAL MEDICINE

## 2023-01-13 PROCEDURE — 2580000003 HC RX 258: Performed by: INTERNAL MEDICINE

## 2023-01-13 PROCEDURE — 36415 COLL VENOUS BLD VENIPUNCTURE: CPT

## 2023-01-13 PROCEDURE — 6370000000 HC RX 637 (ALT 250 FOR IP): Performed by: NURSE PRACTITIONER

## 2023-01-13 PROCEDURE — 93010 ELECTROCARDIOGRAM REPORT: CPT | Performed by: INTERNAL MEDICINE

## 2023-01-13 PROCEDURE — 80048 BASIC METABOLIC PNL TOTAL CA: CPT

## 2023-01-13 PROCEDURE — 85027 COMPLETE CBC AUTOMATED: CPT

## 2023-01-13 RX ORDER — ASPIRIN 81 MG/1
81 TABLET, CHEWABLE ORAL DAILY
Qty: 30 TABLET | Refills: 3 | Status: SHIPPED | OUTPATIENT
Start: 2023-01-14

## 2023-01-13 RX ORDER — PANTOPRAZOLE SODIUM 40 MG/1
40 TABLET, DELAYED RELEASE ORAL DAILY
Qty: 30 TABLET | Refills: 0 | Status: SHIPPED | OUTPATIENT
Start: 2023-01-13

## 2023-01-13 RX ORDER — CLOPIDOGREL BISULFATE 75 MG/1
75 TABLET ORAL DAILY
Qty: 30 TABLET | Refills: 3 | Status: SHIPPED | OUTPATIENT
Start: 2023-01-14

## 2023-01-13 RX ORDER — ATORVASTATIN CALCIUM 80 MG/1
80 TABLET, FILM COATED ORAL NIGHTLY
Qty: 30 TABLET | Refills: 3 | Status: SHIPPED | OUTPATIENT
Start: 2023-01-13

## 2023-01-13 RX ORDER — METOPROLOL SUCCINATE 50 MG/1
50 TABLET, EXTENDED RELEASE ORAL DAILY
Qty: 30 TABLET | Refills: 3 | Status: SHIPPED | OUTPATIENT
Start: 2023-01-13

## 2023-01-13 RX ADMIN — METOPROLOL TARTRATE 25 MG: 25 TABLET, FILM COATED ORAL at 11:24

## 2023-01-13 RX ADMIN — ACETAMINOPHEN 650 MG: 325 TABLET ORAL at 09:09

## 2023-01-13 RX ADMIN — Medication 1000 UNITS: at 09:11

## 2023-01-13 RX ADMIN — CLOPIDOGREL BISULFATE 75 MG: 75 TABLET ORAL at 09:10

## 2023-01-13 RX ADMIN — SODIUM CHLORIDE, PRESERVATIVE FREE 10 ML: 5 INJECTION INTRAVENOUS at 09:11

## 2023-01-13 RX ADMIN — ASPIRIN 81 MG: 81 TABLET, CHEWABLE ORAL at 09:09

## 2023-01-13 RX ADMIN — METOPROLOL TARTRATE 25 MG: 25 TABLET, FILM COATED ORAL at 09:10

## 2023-01-13 ASSESSMENT — PAIN DESCRIPTION - LOCATION: LOCATION: HEAD

## 2023-01-13 ASSESSMENT — PAIN SCALES - GENERAL
PAINLEVEL_OUTOF10: 0
PAINLEVEL_OUTOF10: 4
PAINLEVEL_OUTOF10: 0

## 2023-01-13 ASSESSMENT — PAIN DESCRIPTION - DESCRIPTORS: DESCRIPTORS: ACHING

## 2023-01-13 NOTE — PLAN OF CARE
Problem: Discharge Planning  Goal: Discharge to home or other facility with appropriate resources  Outcome: Completed     Problem: Pain  Goal: Verbalizes/displays adequate comfort level or baseline comfort level  Outcome: Completed     Problem: Safety - Adult  Goal: Free from fall injury  1/13/2023 1724 by Eric Washburn RN  Outcome: Completed  1/13/2023 0443 by Gabriela Curry RN  Outcome: Progressing     Problem: ABCDS Injury Assessment  Goal: Absence of physical injury  Outcome: Completed

## 2023-01-13 NOTE — PROGRESS NOTES
Pt was given discharge teaching and instructions. Pt verbalized understanding. IV was removed without complications. Pt got dressed and gathered personal belongings independently. Pt called spouse to pick her up. Pt was brought by wheelchair to personal vehicle where she got into front seat of her car without issues with spouse.

## 2023-01-13 NOTE — PLAN OF CARE
Problem: Safety - Adult  Goal: Free from fall injury  Outcome: Progressing   PT encouraged to call if hrlp

## 2023-01-13 NOTE — CARE COORDINATION
Case Management Assessment            Discharge Note                    Date / Time of Note: 1/13/2023 10:45 AM                  Discharge Note Completed by: Mayda Cabrera RN    Patient Name: Norma Johnson [address] [place] [address]   YOB: 1960  Diagnosis: Chest pain [R07.9]  Abnormal stress test [R94.39]  Chest pain, unspecified type [R07.9]   Date / Time: 1/11/2023  3:41 AM    Current PCP: ANTONETTE Aguilar CNP  Clinic patient: No    Hospitalization in the last 30 days: No    Advance Directives:  Code Status: Full Code  [place] DNR form completed and on chart: No    Financial:  Payor: UMR / Plan: UMR  / Product Type: *No Product type* /      Pharmacy:    [facility] [address] [place] [address] Jessika Patches [phone] - F [phone]  Tiffani Meza [address]  [address] [place] [address]  Phone: [phone] Fax: [phone]      Assistance purchasing medications?: Potential Assistance Purchasing Medications: No  Assistance provided by Case Management: None at this time    Does patient want to participate in local refill/ meds to beds program?:      Meds To Beds General Rules:  1. Can ONLY be done Monday- Friday between 8:30am-5pm  2. Prescription(s) must be in pharmacy by 3pm to be filled same day  3. Copy of patient's insurance/ prescription drug card and patient face sheet must be sent along with the prescription(s)  4. Cost of Rx cannot be added to hospital bill. If financial assistance is needed, please contact unit  or ;  or  CANNOT provide pharmacy voucher for patients co-pays  5.  Patients can then  the prescription on their way out of the hospital at discharge, or pharmacy can deliver to the bedside if staff is available. (payment due at time of pick-up or delivery - cash, check, or card accepted)     Able to afford home medications/ co-pay costs: Yes    ADLS:  Current PT AM-PAC Score:   /24  Current OT AM-PAC Score: /24      DISCHARGE Disposition: Home- No Services Needed    LOC at discharge: Not Applicable  KEITH Completed: No    Notification completed in HENS/PAS?:  Not Applicable    IMM Completed:   Not Indicated    Transportation:  Transportation PLAN for discharge: family   Mode of Transport: Private Car  Reason for medical transport: Not Applicable  Name of Transport Company: Not Applicable  Time of Transport: when family available    Transport form completed: No    Home Care:  [address] ordered at discharge: No  [address]: Not Applicable  Orders faxed: No    Durable Medical Equipment:  DME Provider: SLIME  Equipment obtained during hospitalization: NA    Home Oxygen and Respiratory Equipment:  Oxygen needed at discharge?: No  [address]: Not Applicable  Portable tank available for discharge?: No    Dialysis:  Dialysis patient: No    Dialysis Center:  Not Applicable    Hospice Services:  Location: Not Applicable  Agency: Not Applicable    Consents signed: No    Referrals made at [facility] for outpatient continued care:  Not Applicable    Additional CM Notes:     CM  confirmed  d/c  home  ( once  cleared  by cardiology ) . Patient to follow up out pt  as  instructed:    New Rx:   E scribed  to her own pharmacy        these medications [facility] [address] [facility] [phone] - F [phone]  aspirin  atorvastatin  clopidogrel  metoprolol succinate  pantoprazole    Jan16 Office Visit 9:45 AM   Ana Luisa Spring, [address]   [address]  [facility] [address] [place] [address]   [phone]           The Plan for Transition of Care is related to the following treatment goals of Chest pain [R07.9]  Abnormal stress test [R94.39]  Chest pain, unspecified type [R07.9]    The Patient and/or patient representative Deborah and her family were provided with a choice of provider and agrees with the discharge plan Yes    Freedom of choice list was provided with basic dialogue that supports the patient's individualized plan of care/goals and shares the quality data associated with the providers.  Yes    Care Transitions patient: No    Coral Nyhan, RN  [facility]  Case Management Department  Ph: [phone]

## 2023-01-13 NOTE — DISCHARGE SUMMARY
Hospital Medicine Discharge Summary    Patient ID: Rupa Adames      Patient's PCP: Jose Miguel Gandhi, APRN - CNP    Admit Date: 1/11/2023     Discharge Date:   ***    Admitting Provider: Sudha Blount MD     Discharge Provider: Jagruti Marquez MD     Discharge Diagnoses: Active Hospital Problems    Diagnosis     Chest pain in adult [R07.9]      Priority: Medium    Chest pain [R07.9]        The patient was seen and examined on day of discharge and this discharge summary is in conjunction with any daily progress note from day of discharge. Hospital Course: ***    CAD:    Covid 23:        Physical Exam Performed:     BP (!) 146/102   Pulse (!) 105   Temp 98.7 °F (37.1 °C) (Oral)   Resp 16   Ht 5' 4\" (1.626 m)   Wt 195 lb 8.8 oz (88.7 kg)   SpO2 95%   BMI 33.57 kg/m²       General appearance:  No apparent distress, appears stated age and cooperative. HEENT:  Normal cephalic, atraumatic without obvious deformity. Pupils equal, round, and reactive to light. Extra ocular muscles intact. Conjunctivae/corneas clear. Neck: Supple, with full range of motion. No jugular venous distention. Trachea midline. Respiratory:  Normal respiratory effort. Clear to auscultation, bilaterally without Rales/Wheezes/Rhonchi. Cardiovascular:  Regular rate and rhythm with normal S1/S2 without murmurs, rubs or gallops. Abdomen: Soft, non-tender, non-distended with normal bowel sounds. Musculoskeletal:  No clubbing, cyanosis or edema bilaterally. Full range of motion without deformity. Skin: Skin color, texture, turgor normal.  No rashes or lesions. Neurologic:  Neurovascularly intact without any focal sensory/motor deficits. Cranial nerves: II-XII intact, grossly non-focal.  Psychiatric:  Alert and oriented, thought content appropriate, normal insight  Capillary Refill: Brisk,< 3 seconds   Peripheral Pulses: +2 palpable, equal bilaterally       Labs:  For convenience and continuity at follow-up the following most recent labs are provided:      CBC:    Lab Results   Component Value Date/Time    WBC 4.6 01/13/2023 05:50 AM    HGB 12.5 01/13/2023 05:50 AM    HCT 38.5 01/13/2023 05:50 AM     01/13/2023 05:50 AM       Renal:    Lab Results   Component Value Date/Time     01/13/2023 05:50 AM    K 4.2 01/13/2023 05:50 AM    K 3.8 05/12/2019 12:24 PM     01/13/2023 05:50 AM    CO2 22 01/13/2023 05:50 AM    BUN 12 01/13/2023 05:50 AM    CREATININE 0.6 01/13/2023 05:50 AM    CALCIUM 9.0 01/13/2023 05:50 AM         Significant Diagnostic Studies    Radiology:   NM Cardiac Stress Test Nuclear Imaging   Final Result      XR CHEST (2 VW)   Final Result      Clear lungs. Normal cardiomediastinal silhouette. Lumbar spine fixation hardware incompletely visualized.              Consults:     IP CONSULT TO CARDIOLOGY  IP CONSULT TO HOSPITALIST    Disposition:  ***     Condition at Discharge: [address] [place] [address] Patient Condition:[id number]}    Discharge Instructions/Follow-up:  ***    Code Status:  Full Code ***    Activity: activity as tolerated    Diet: {diet:07903}      Discharge Medications:     Current Discharge Medication List             Details   clopidogrel (PLAVIX) 75 MG tablet Take 1 tablet by mouth daily  Qty: 30 tablet, Refills: 3      atorvastatin (LIPITOR) 80 MG tablet Take 1 tablet by mouth nightly  Qty: 30 tablet, Refills: 3      aspirin 81 MG chewable tablet Take 1 tablet by mouth daily  Qty: 30 tablet, Refills: 3      metoprolol succinate (TOPROL XL) 50 MG extended release tablet Take 1 tablet by mouth daily  Qty: 30 tablet, Refills: 3      pantoprazole (PROTONIX) 40 MG tablet Take 1 tablet by mouth daily  Qty: 30 tablet, Refills: 0                Details   methocarbamol (ROBAXIN) 750 MG tablet Take 750 mg by mouth every 6 hours as needed      butalbital-APAP-caffeine -40 MG CAPS per capsule Take 1 capsule by mouth every 6 hours as needed for Headaches or Migraine  Qty: 25 capsule, Refills: 0 Erenumab-aooe (AIMOVIG) 70 MG/ML SOAJ INJECT 1 PEN MONTHLY  Qty: 3 mL, Refills: 12    Associated Diagnoses: 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 Signed:    Livia Goltz, MD   1/13/2023      Thank you Cherylene Guardian, APRN - WOODY for the opportunity to be involved in this patient's care. If you have any questions or concerns, please feel free to contact me at [phone].

## 2023-01-16 DIAGNOSIS — G43.719 INTRACTABLE CHRONIC MIGRAINE WITHOUT AURA AND WITHOUT STATUS MIGRAINOSUS: ICD-10-CM

## 2023-01-16 RX ORDER — ERENUMAB-AOOE 70 MG/ML
INJECTION SUBCUTANEOUS
Qty: 3 ML | Refills: 12 | Status: SHIPPED | OUTPATIENT
Start: 2023-01-16

## 2023-01-18 ENCOUNTER — TELEPHONE (OUTPATIENT)
Dept: FAMILY MEDICINE CLINIC | Age: 63
End: 2023-01-18

## 2023-01-18 NOTE — TELEPHONE ENCOUNTER
Pt's Atogepant (Carolina Aguirre) 30 MG TABS PA was approved from 12/20/22-6/19/23  Jose needs a new script sent to     [facility] Get[facility] [address] [place] [address] Nico Meza [address], [address] [address]   Phone:  [phone]  Fax:  [phone]

## 2023-01-20 ENCOUNTER — OFFICE VISIT (OUTPATIENT)
Dept: CARDIOLOGY CLINIC | Age: 63
End: 2023-01-20
Payer: COMMERCIAL

## 2023-01-20 VITALS
BODY MASS INDEX: 33.73 KG/M2 | HEART RATE: 84 BPM | WEIGHT: 197.6 LBS | HEIGHT: 64 IN | DIASTOLIC BLOOD PRESSURE: 70 MMHG | OXYGEN SATURATION: 97 % | SYSTOLIC BLOOD PRESSURE: 130 MMHG

## 2023-01-20 DIAGNOSIS — I10 ESSENTIAL HYPERTENSION, BENIGN: ICD-10-CM

## 2023-01-20 DIAGNOSIS — I25.10 CAD S/P PERCUTANEOUS CORONARY ANGIOPLASTY: Primary | ICD-10-CM

## 2023-01-20 DIAGNOSIS — E78.2 MIXED HYPERLIPIDEMIA: ICD-10-CM

## 2023-01-20 DIAGNOSIS — Z98.61 CAD S/P PERCUTANEOUS CORONARY ANGIOPLASTY: Primary | ICD-10-CM

## 2023-01-20 DIAGNOSIS — R53.83 FATIGUE, UNSPECIFIED TYPE: ICD-10-CM

## 2023-01-20 PROCEDURE — 3074F SYST BP LT 130 MM HG: CPT | Performed by: NURSE PRACTITIONER

## 2023-01-20 PROCEDURE — 3078F DIAST BP <80 MM HG: CPT | Performed by: NURSE PRACTITIONER

## 2023-01-20 PROCEDURE — 99214 OFFICE O/P EST MOD 30 MIN: CPT | Performed by: NURSE PRACTITIONER

## 2023-01-20 NOTE — PROGRESS NOTES
CC hosptial follow up after coronary angioplasty/stent    HPI:  65 y.o. patient of Dr Jolly Ron with CAD, HTN, HLD who recently had PCI of RCA with  3.0 x 15 mm [place] frontier SHARON. She c/o fatigue. Denies cp, sob, LH/dizziness, palpitations, syncope or falls. No LE edema, orthopnea, PND, abdominal bloating or early satiety. Denies n/v/d, fever or GI/ bleeding. Denies right radial pain or numbness. Tolerating medications.      Past Medical History:   Diagnosis Date    Essential hypertension, benign 2011    GERD (gastroesophageal reflux disease) 2016    Hyperlipidemia 2011    Migraine     Osteoarthritis of right knee 2011    Pyriformis syndrome 2012    Screening mammogram for high-risk patient *2019    Benign    Screening mammogram, encounter for *May 26, 2020     Past Surgical History:   Procedure Laterality Date    COLONOSCOPY  *May 13, 2016 (  )    Dr. Debbie Gasca - diverticulosis, polyp ( colonoc mucosa )    ECTOPIC PREGNANCY SURGERY      ENDOMETRIAL ABLATION      HYSTERECTOMY (CERVIX STATUS UNKNOWN)      Yen Perry      Dr. Darya Cantu  *May, 2016    Dr. Debbie Gasca - erosive esophagitis    UPPER GASTROINTESTINAL ENDOSCOPY  *2019    Dr. Debbie Gasca - gastric nodule ( biopsy benign )    UPPER GASTROINTESTINAL ENDOSCOPY N/A 2021    EGD ESOPHAGOGASTRODUODENOSCOPY ULTRASOUND performed by Gerald Villarreal MD at [address] N/A 2021    EGD W/ EMR performed by Gerald Villarreal MD at [facility]        Family History   Problem Relation Age of Onset    Ovarian Cancer Maternal Aunt     Cancer Maternal Grandmother     Cancer Paternal Grandfather     Other Father          - cause unknown     Diabetes Mother 80        Alive - DM, Hypertension, heart disease     Rheum Arthritis Neg Hx     Osteoarthritis Neg Hx     Asthma Neg Hx     Breast Cancer Neg Hx     Heart Failure Neg Hx     High Cholesterol Neg Hx     Hypertension Neg Hx     Migraines Neg Hx     Rashes/Skin Problems Neg Hx     Seizures Neg Hx     Stroke Neg Hx     Thyroid Disease Neg Hx      Social History     Tobacco Use    Smoking status: Former     Packs/day: 0.50     Years: 13.00     Pack years: 6.50     Types: Cigarettes     Quit date: 2000     Years since quittin.0    Smokeless tobacco: Never   Vaping Use    Vaping Use: Never used   Substance Use Topics    Alcohol use: Not Currently     Alcohol/week: 0.0 standard drinks    Drug use: No     Allergies:Penicillins, Prednisone, Statins, and Tramadol    Review of Systems  General: No changes in weight, fatigue, or night sweats. HEENT: No blurry or decreased vision. No changes in hearing, nasal discharge or sore throat. Cardiovascular:  See HPI. Respiratory: No cough, hemoptysis, or wheezing. Gastrointestinal:  No abdominal pain, hematochezia, melana, constipation, diarrhea, or history of GI ulcers. Genito-Urinary: No dysuria or hematuria. No urgency or polyuria. Musculoskeletal:  No complaints of joint pain, joint swelling or muscular weakness/soreness. Neurological:  No dizziness, headaches, numbness/tingling, speech problems or weakness. Psychological:  No anxiety or depression. Hematological and Lymphatic: No abnormal bleeding or bruising, blood clots, jaundice or swollen lymph nodes. Endocrine:   No malaise/lethargy, palpitations, polydipsia/polyuria, temperature intolerance or unexpected weight changes  Skin:  No rashes or non-healing ulcers.     The 10-year ASCVD risk score (Elizabeth OSUNA, et al., 2019) is: 6.8%    Values used to calculate the score:      Age: 65 years      Sex: Female      Is Non- : Yes      Diabetic: No      Tobacco smoker: No      Systolic Blood Pressure: 378 mmHg      Is BP treated: No      HDL Cholesterol: 52 mg/dL      Total Cholesterol: 227 mg/dL    Physical Exam:  /70 (Site: Left Upper Arm, Position: Sitting, Cuff Size: Medium Adult)   Pulse 84   Ht 5' 4\" (1.626 m)   Wt 197 lb 9.6 oz (89.6 kg)   SpO2 97%   BMI 33.92 kg/m²    General (appearance):  No acute distress  Eyes: anicteric   Neck: soft, No JVD  Ears/Nose/Mouth/Thorat: No cyanosis  CV: RRR   Respiratory:  clear, normal effort  GI: soft, non-tender, non-distended  Skin: Warm, dry. No rashes  Neuro/Psych: Alert and oriented x 3. Appropriate behavior  Ext:  No c/c. No  edema  Pulses:  2+ right radial. Wrist soft, no hematoma, good cap refill. Weight  Wt Readings from Last 3 Encounters:   01/20/23 197 lb 9.6 oz (89.6 kg)   01/12/23 195 lb 8.8 oz (88.7 kg)   09/06/22 188 lb (85.3 kg)          CBC:   Lab Results   Component Value Date    WBC 4.6 01/13/2023    HGB 12.5 01/13/2023    HCT 38.5 01/13/2023    MCV 85.2 01/13/2023     01/13/2023     BMP:  Lab Results   Component Value Date    CREATININE 0.6 01/13/2023    BUN 12 01/13/2023     01/13/2023    K 4.2 01/13/2023     01/13/2023    CO2 22 01/13/2023     Estimated Creatinine Clearance: 107 mL/min (based on SCr of 0.6 mg/dL).   Mag: No results found for: MG  LIVER PROFILE:   Lab Results   Component Value Date    ALT 12 01/11/2023    AST 17 01/11/2023    ALKPHOS 60 01/11/2023    BILITOT <0.2 01/11/2023     PT/INR:   Lab Results   Component Value Date    INR 1.11 01/11/2023    PROTIME 14.2 01/11/2023     Pro-BNP   Lab Results   Component Value Date/Time    PROBNP 27 05/12/2019 12:24 PM     LIPIDS:  No components found for: CHLPL  Lab Results   Component Value Date    TRIG 144 01/12/2023    TRIG 125 01/11/2023    TRIG 122 07/12/2021     Lab Results   Component Value Date    HDL 52 01/12/2023    HDL 53 01/11/2023    HDL 57 08/03/2022     Lab Results   Component Value Date    LDLCALC 146 (H) 01/12/2023    LDLCALC 157 (H) 01/11/2023    LDLCALC 145 (H) 08/03/2022     Lab Results   Component Value Date    LABVLDL 29 01/12/2023    LABVLDL 25 01/11/2023 LABVLDL 24 2022     TSH:  Lab Results   Component Value Date    TSH 1.18 2021       IMAGIN2023 C  Left main normal  LAD normal diagonal branches septal perforators normal.  Left circumflex and obtuse marginal branches all normal  RCA dominant 75 to 80% mid vessel stenosis. Left ventricle LVEF 50% LVEDP 11 mmHg no gradient. PCI:  Heparin IV. MAC guide BMW wire. 3.0 x 15 mm [place] frontier SHARON deployed at 12 alexsandra x 20 seconds. LULA-3 flow present with nearly 0% residual stenosis and procedure was completed. 2023 Nuc stress      Small and mild, reversible, distal anteroseptal wall perfusion defect    suggestive of ischemia. Normal LV size and mildly depressed systolic function. Left ventricular ejection fraction of 49 %. There is hypokinesis of the apex and mid to distal anteroseptal wall. Overall findings represent a intermediate risk scan. Dr Butch Jauregui at ED was notified of results. Medications:   Current Outpatient Medications   Medication Sig Dispense Refill    Erenumab-aooe (AIMOVIG) 70 MG/ML SOAJ INJECT 1 PEN UNDER THE SKIN ONCE EVERY MONTH 3 mL 12    clopidogrel (PLAVIX) 75 MG tablet Take 1 tablet by mouth daily 30 tablet 3    atorvastatin (LIPITOR) 80 MG tablet Take 1 tablet by mouth nightly 30 tablet 3    aspirin 81 MG chewable tablet Take 1 tablet by mouth daily 30 tablet 3    metoprolol succinate (TOPROL XL) 50 MG extended release tablet Take 1 tablet by mouth daily 30 tablet 3    pantoprazole (PROTONIX) 40 MG tablet Take 1 tablet by mouth daily 30 tablet 0    methocarbamol (ROBAXIN) 750 MG tablet Take 750 mg by mouth every 6 hours as needed      butalbital-APAP-caffeine -40 MG CAPS per capsule Take 1 capsule by mouth every 6 hours as needed for Headaches or Migraine 25 capsule 0    Atogepant (QULIPTA) 30 MG TABS Take 30 mg by mouth daily 90 tablet 3     No current facility-administered medications for this visit.        Assessment:  1. CAD S/P percutaneous coronary angioplasty    2. Mixed hyperlipidemia    3. Essential hypertension, benign    4. Fatigue, unspecified type        Plan:  CAD s/p PCI RCA: stable    ASA, plavix   Lipitor   Toprol    HTN: stable    /70   Toprol  HLD: stable    Lipitor       Recently started high dose statin. Will recheck lipids/cmp in 6-8 weeks   IF LDL > 70 then start repatha. Discussed with patient    Fatigue: acute    Start taking toprol at bedtime.     Follow up in 1 month     Reviewed most recent: CBC, BMP, LFT, Lipids, PT/INR, BNP, TSH  Reviewed most recent: ECG, Nuc stress test, LHC

## 2023-01-27 ENCOUNTER — TELEPHONE (OUTPATIENT)
Dept: CARDIOLOGY CLINIC | Age: 63
End: 2023-01-27

## 2023-01-27 DIAGNOSIS — M79.10 MYALGIA: Primary | ICD-10-CM

## 2023-01-27 NOTE — TELEPHONE ENCOUNTER
Patient called to report medication side effect of Lipitor. Pt states she is having pain all over but mainly in her legs. It starts soon after she takes her medication and continues throughout the day. She has started having trouble sleeping dur to pain.  Its a throbbing ache, please advise [phone]

## 2023-01-27 NOTE — TELEPHONE ENCOUNTER
Check CK (lab I ordered in epic)  Decrease lipitor to 40 mg po daily  Give 2 weeks. If symptoms haven't improved then she may need to stop lipitor completely.

## 2023-01-30 LAB
AVERAGE GLUCOSE: NORMAL
HBA1C MFR BLD: 5.6 %

## 2023-01-30 RX ORDER — BUTALBITAL, ACETAMINOPHEN AND CAFFEINE 300; 40; 50 MG/1; MG/1; MG/1
1 CAPSULE ORAL EVERY 6 HOURS PRN
Qty: 25 CAPSULE | Refills: 0 | Status: SHIPPED | OUTPATIENT
Start: 2023-01-30

## 2023-01-31 RX ORDER — ATOGEPANT 30 MG/1
30 TABLET ORAL DAILY
Qty: 90 TABLET | Refills: 3 | Status: SHIPPED | OUTPATIENT
Start: 2023-01-31

## 2023-02-10 ENCOUNTER — OFFICE VISIT (OUTPATIENT)
Dept: CARDIOLOGY CLINIC | Age: 63
End: 2023-02-10

## 2023-02-10 VITALS
SYSTOLIC BLOOD PRESSURE: 136 MMHG | DIASTOLIC BLOOD PRESSURE: 76 MMHG | WEIGHT: 201 LBS | BODY MASS INDEX: 34.5 KG/M2 | HEART RATE: 96 BPM

## 2023-02-10 DIAGNOSIS — E78.5 HYPERLIPIDEMIA, UNSPECIFIED HYPERLIPIDEMIA TYPE: ICD-10-CM

## 2023-02-10 DIAGNOSIS — Z98.61 CAD S/P PERCUTANEOUS CORONARY ANGIOPLASTY: ICD-10-CM

## 2023-02-10 DIAGNOSIS — I10 HTN (HYPERTENSION), BENIGN: Primary | ICD-10-CM

## 2023-02-10 DIAGNOSIS — I25.10 CAD S/P PERCUTANEOUS CORONARY ANGIOPLASTY: ICD-10-CM

## 2023-02-10 DIAGNOSIS — I25.10 CORONARY ARTERY DISEASE INVOLVING NATIVE CORONARY ARTERY OF NATIVE HEART WITHOUT ANGINA PECTORIS: ICD-10-CM

## 2023-02-10 PROCEDURE — 3075F SYST BP GE 130 - 139MM HG: CPT | Performed by: INTERNAL MEDICINE

## 2023-02-10 PROCEDURE — 99214 OFFICE O/P EST MOD 30 MIN: CPT | Performed by: INTERNAL MEDICINE

## 2023-02-10 PROCEDURE — 3078F DIAST BP <80 MM HG: CPT | Performed by: INTERNAL MEDICINE

## 2023-02-10 RX ORDER — ROSUVASTATIN CALCIUM 10 MG/1
10 TABLET, COATED ORAL DAILY
Qty: 30 TABLET | Refills: 4 | Status: SHIPPED | OUTPATIENT
Start: 2023-02-10

## 2023-02-10 NOTE — PROGRESS NOTES
[facility]- Progress Note          CC:  follow up CP and RCA stenting  (1 SHARON)    Subjective:  Ms. Allie Spence has leg pain from lipitor. CP is resolved. Objective:   /76   Pulse 96   Wt 201 lb (91.2 kg)   BMI 34.50 kg/m²   No intake or output data in the 24 hours ending 02/10/23 1423    Sinus clinically    Physical Exam:  General:  Awake, alert, NAD  Eyes:  EOMI PERRL anicteric  Skin:  Warm and dry. Neck:  JVP normal  Chest:  Diminshed. No Rales. Cardiovascular:  RRR, Normal S1S2. No Murmur. No Rub. No Gallops. Abdomen:  Soft NT  + bs  Extremities:  No edema  Neuro:  CN II-XII intact. No focal deficits. No weakness. No lateralizing findings. Psych:  Normal thought and affect. MSK:  No Cyanosis nor Clubbing. Symmetrical strength upper and lower extremities     Diagnosis Orders   1. HTN (hypertension), benign        2. Hyperlipidemia, unspecified hyperlipidemia type        3. Coronary artery disease involving native coronary artery of native heart without angina pectoris        4. CAD S/P percutaneous coronary angioplasty                Assessment:  Patient Active Problem List    Diagnosis Date Noted    Chest pain in adult 01/11/2023    Elevated glucose 09/09/2021    Chest pain 04/22/2016    DDD (degenerative disc disease), lumbar 01/16/2012    Hyperlipidemia 07/07/2011    Intractable migraine 02/02/2007    Essential hypertension, benign 01/08/2007       Plan:  CAD:  doing well after RCA stent x 1 but has leg pain and fatigue  HTN:  controlled with metoprolol but will change from 50 mg daily xl to 1/2 tablet twice daily to help with fatigue. 3.  HLP:  cannot tolerate lipitor . Holiday and then start crestor 10 mg daily in a week.     Core Measures:  Discharge instructions:   LVEF documented:   ACEI for LV dysfunction:   Smoking Cessation:    Kayy Richards MD, MD 2/10/2023 2:23 PM

## 2023-02-20 ENCOUNTER — TELEPHONE (OUTPATIENT)
Dept: CARDIOLOGY CLINIC | Age: 63
End: 2023-02-20

## 2023-02-20 RX ORDER — PANTOPRAZOLE SODIUM 40 MG/1
40 TABLET, DELAYED RELEASE ORAL
Qty: 30 TABLET | Refills: 0 | Status: SHIPPED | OUTPATIENT
Start: 2023-02-20

## 2023-03-07 DIAGNOSIS — Z98.61 CAD S/P PERCUTANEOUS CORONARY ANGIOPLASTY: ICD-10-CM

## 2023-03-07 DIAGNOSIS — I25.10 CAD S/P PERCUTANEOUS CORONARY ANGIOPLASTY: ICD-10-CM

## 2023-03-07 DIAGNOSIS — E78.2 MIXED HYPERLIPIDEMIA: ICD-10-CM

## 2023-03-07 DIAGNOSIS — M79.10 MYALGIA: ICD-10-CM

## 2023-03-07 LAB
A/G RATIO: 1 (ref 1.1–2.2)
ALBUMIN SERPL-MCNC: 3.7 G/DL (ref 3.4–5)
ALP BLD-CCNC: 55 U/L (ref 40–129)
ALT SERPL-CCNC: 21 U/L (ref 10–40)
ANION GAP SERPL CALCULATED.3IONS-SCNC: 16 MMOL/L (ref 3–16)
AST SERPL-CCNC: 30 U/L (ref 15–37)
BILIRUB SERPL-MCNC: 0.3 MG/DL (ref 0–1)
BUN BLDV-MCNC: 26 MG/DL (ref 7–20)
CALCIUM SERPL-MCNC: 9.3 MG/DL (ref 8.3–10.6)
CHLORIDE BLD-SCNC: 97 MMOL/L (ref 99–110)
CHOLESTEROL, TOTAL: 157 MG/DL (ref 0–199)
CO2: 21 MMOL/L (ref 21–32)
CREAT SERPL-MCNC: 1.5 MG/DL (ref 0.6–1.2)
GFR SERPL CREATININE-BSD FRML MDRD: 39 ML/MIN/{1.73_M2}
GLUCOSE BLD-MCNC: 105 MG/DL (ref 70–99)
HDLC SERPL-MCNC: 35 MG/DL (ref 40–60)
LDL CHOLESTEROL CALCULATED: 94 MG/DL
POTASSIUM SERPL-SCNC: 4.1 MMOL/L (ref 3.5–5.1)
SODIUM BLD-SCNC: 134 MMOL/L (ref 136–145)
TOTAL CK: 1601 U/L (ref 26–192)
TOTAL PROTEIN: 7.4 G/DL (ref 6.4–8.2)
TRIGL SERPL-MCNC: 142 MG/DL (ref 0–150)
VLDLC SERPL CALC-MCNC: 28 MG/DL

## 2023-03-08 DIAGNOSIS — I10 HTN (HYPERTENSION), BENIGN: ICD-10-CM

## 2023-03-08 DIAGNOSIS — E78.5 HYPERLIPIDEMIA, UNSPECIFIED HYPERLIPIDEMIA TYPE: Primary | ICD-10-CM

## 2023-03-09 ENCOUNTER — TELEPHONE (OUTPATIENT)
Dept: FAMILY MEDICINE CLINIC | Age: 63
End: 2023-03-09

## 2023-03-09 NOTE — TELEPHONE ENCOUNTER
These are cardiology questions. Anat Wright is her NP for this. I understand she is to be off all statins for now to allow healing and clearance of the buildup.

## 2023-03-09 NOTE — TELEPHONE ENCOUNTER
Pt called and stated that she has questions regarding her blood work and the stint that was put in. She also has questions on the rosuvastatin. She states that she can't take any statins. She is afraid that her kidneys will had adverse issues.

## 2023-03-15 DIAGNOSIS — E78.5 HYPERLIPIDEMIA, UNSPECIFIED HYPERLIPIDEMIA TYPE: ICD-10-CM

## 2023-03-15 DIAGNOSIS — I10 HTN (HYPERTENSION), BENIGN: ICD-10-CM

## 2023-03-15 LAB
ANION GAP SERPL CALCULATED.3IONS-SCNC: 15 MMOL/L (ref 3–16)
BUN SERPL-MCNC: 18 MG/DL (ref 7–20)
CALCIUM SERPL-MCNC: 9.1 MG/DL (ref 8.3–10.6)
CHLORIDE SERPL-SCNC: 102 MMOL/L (ref 99–110)
CK SERPL-CCNC: 170 U/L (ref 26–192)
CO2 SERPL-SCNC: 23 MMOL/L (ref 21–32)
CREAT SERPL-MCNC: 0.7 MG/DL (ref 0.6–1.2)
GFR SERPLBLD CREATININE-BSD FMLA CKD-EPI: >60 ML/MIN/{1.73_M2}
GLUCOSE SERPL-MCNC: 103 MG/DL (ref 70–99)
POTASSIUM SERPL-SCNC: 4.9 MMOL/L (ref 3.5–5.1)
SODIUM SERPL-SCNC: 140 MMOL/L (ref 136–145)

## 2023-03-21 ENCOUNTER — TELEPHONE (OUTPATIENT)
Dept: CARDIOLOGY CLINIC | Age: 63
End: 2023-03-21

## 2023-03-22 ENCOUNTER — OFFICE VISIT (OUTPATIENT)
Dept: CARDIOLOGY CLINIC | Age: 63
End: 2023-03-22

## 2023-03-22 VITALS
DIASTOLIC BLOOD PRESSURE: 80 MMHG | BODY MASS INDEX: 33.81 KG/M2 | WEIGHT: 197 LBS | HEART RATE: 120 BPM | SYSTOLIC BLOOD PRESSURE: 124 MMHG | OXYGEN SATURATION: 96 %

## 2023-03-22 DIAGNOSIS — E78.2 MIXED HYPERLIPIDEMIA: ICD-10-CM

## 2023-03-22 DIAGNOSIS — R00.0 TACHYCARDIA: Primary | ICD-10-CM

## 2023-03-22 DIAGNOSIS — Z98.61 CAD S/P PERCUTANEOUS CORONARY ANGIOPLASTY: ICD-10-CM

## 2023-03-22 DIAGNOSIS — I10 ESSENTIAL HYPERTENSION, BENIGN: ICD-10-CM

## 2023-03-22 DIAGNOSIS — I25.10 CAD S/P PERCUTANEOUS CORONARY ANGIOPLASTY: ICD-10-CM

## 2023-03-22 PROCEDURE — 93000 ELECTROCARDIOGRAM COMPLETE: CPT | Performed by: NURSE PRACTITIONER

## 2023-03-22 PROCEDURE — 3079F DIAST BP 80-89 MM HG: CPT | Performed by: NURSE PRACTITIONER

## 2023-03-22 PROCEDURE — 3074F SYST BP LT 130 MM HG: CPT | Performed by: NURSE PRACTITIONER

## 2023-03-22 PROCEDURE — 99214 OFFICE O/P EST MOD 30 MIN: CPT | Performed by: NURSE PRACTITIONER

## 2023-03-22 RX ORDER — NADOLOL 20 MG/1
20 TABLET ORAL DAILY
Qty: 30 TABLET | Refills: 3 | Status: SHIPPED | OUTPATIENT
Start: 2023-03-22

## 2023-03-22 NOTE — PROGRESS NOTES
CC hosptial follow up after coronary angioplasty/stent    HPI:  65 y.o. patient of Dr Darby Wen with CAD, HTN, HLD who recently had PCI of RCA with  3.0 x 15 mm [place] frontier SHARON. She continues to feel fatigued even after taking metoprolol at bedtime. She stopped the statin d/t severe myalgia. She has a poor appetite. She will try to get Repatha once insurance is straightened out. Applying of medicaid    HR today 118. Typical HR 80-90s    She c/o nausea. She bruises easily  and has blood tinged snot when she blows her nose. She denies cp, sob, LH/dizziness, palpitations, syncope or falls. No LE edema, orthopnea, PND, or abdominal bloating. Denies n/v/d, fever or GI/ bleeding.     Past Medical History:   Diagnosis Date    CAD S/P percutaneous coronary angioplasty 01/12/2023    RCA  3.0 x 15 mm [place] frontier SHARON    Essential hypertension, benign 07/05/2011    GERD (gastroesophageal reflux disease) 04/22/2016    Hyperlipidemia 07/07/2011    Migraine     Osteoarthritis of right knee 07/07/2011    Pyriformis syndrome 12/31/2012    Screening mammogram for high-risk patient *June 11, 2019    Benign    Screening mammogram, encounter for *May 26, 2020     Past Surgical History:   Procedure Laterality Date    COLONOSCOPY  *May 13, 2016 ( 2026 )    Dr. Junior Bi - diverticulosis, polyp ( colonoc mucosa )    ECTOPIC PREGNANCY SURGERY      ENDOMETRIAL ABLATION      HYSTERECTOMY (CERVIX STATUS UNKNOWN)      [facility]  2014    Dr. Phil Bhatti  *May, 2016    Dr. Junior Bi - erosive esophagitis    UPPER GASTROINTESTINAL ENDOSCOPY  *Sept.24, 2019    Dr. Junior Bi - gastric nodule ( biopsy benign )    UPPER GASTROINTESTINAL ENDOSCOPY N/A 8/31/2021    EGD ESOPHAGOGASTRODUODENOSCOPY ULTRASOUND performed by Lalo Mcdonald MD at [facility] 8/31/2021    EGD W/ EMR performed by Lalo Mcdonald MD at [facility]
LABVLDL 25 2023     TSH:  Lab Results   Component Value Date    TSH 1.18 2021       IMAGIN2023 C  Left main normal  LAD normal diagonal branches septal perforators normal.  Left circumflex and obtuse marginal branches all normal  RCA dominant 75 to 80% mid vessel stenosis. Left ventricle LVEF 50% LVEDP 11 mmHg no gradient. PCI:  Heparin IV. MAC guide BMW wire. 3.0 x 15 mm [place] frontier SHARON deployed at 12 alexsandra x 20 seconds. LULA-3 flow present with nearly 0% residual stenosis and procedure was completed. 2023 Nuc stress      Small and mild, reversible, distal anteroseptal wall perfusion defect    suggestive of ischemia. Normal LV size and mildly depressed systolic function. Left ventricular ejection fraction of 49 %. There is hypokinesis of the apex and mid to distal anteroseptal wall. Overall findings represent a intermediate risk scan. Dr Rajani Harris at ED was notified of results.        Medications:   Current Outpatient Medications   Medication Sig Dispense Refill    butalbital-APAP-caffeine -40 MG CAPS per capsule Take 1 capsule by mouth every 6 hours as needed for Headaches or Migraine 25 capsule 0    aspirin 81 MG chewable tablet Take 1 tablet by mouth daily 30 tablet 3    metoprolol succinate (TOPROL XL) 50 MG extended release tablet Take 1 tablet by mouth daily 30 tablet 3    methocarbamol (ROBAXIN) 750 MG tablet Take 750 mg by mouth every 6 hours as needed      Evolocumab 140 MG/ML SOAJ Inject 140 mg into the skin every 14 days (Patient not taking: Reported on 3/22/2023) 2 Adjustable Dose Pre-filled Pen Syringe 3    Atogepant (QULIPTA) 30 MG TABS Take 30 mg by mouth daily (Patient not taking: Reported on 3/22/2023) 90 tablet 3    Erenumab-aooe (AIMOVIG) 70 MG/ML SOAJ INJECT 1 PEN UNDER THE SKIN ONCE EVERY MONTH (Patient not taking: Reported on 3/22/2023) 3 mL 12    clopidogrel (PLAVIX) 75 MG tablet Take 1 tablet by mouth daily (Patient not taking: Reported

## 2023-04-02 DIAGNOSIS — G43.719 INTRACTABLE CHRONIC MIGRAINE WITHOUT AURA AND WITHOUT STATUS MIGRAINOSUS: Primary | ICD-10-CM

## 2023-04-03 RX ORDER — BUTALBITAL, ACETAMINOPHEN AND CAFFEINE 300; 40; 50 MG/1; MG/1; MG/1
1 CAPSULE ORAL EVERY 6 HOURS PRN
Qty: 25 CAPSULE | Refills: 0 | OUTPATIENT
Start: 2023-04-03

## 2023-04-03 RX ORDER — BUTALBITAL, ACETAMINOPHEN AND CAFFEINE 300; 40; 50 MG/1; MG/1; MG/1
CAPSULE ORAL
Qty: 25 CAPSULE | Refills: 0 | Status: SHIPPED | OUTPATIENT
Start: 2023-04-03

## 2023-05-02 ENCOUNTER — OFFICE VISIT (OUTPATIENT)
Dept: FAMILY MEDICINE CLINIC | Age: 63
End: 2023-05-02
Payer: COMMERCIAL

## 2023-05-02 VITALS
HEART RATE: 64 BPM | OXYGEN SATURATION: 98 % | TEMPERATURE: 97.5 F | BODY MASS INDEX: 35.02 KG/M2 | WEIGHT: 204 LBS | SYSTOLIC BLOOD PRESSURE: 124 MMHG | DIASTOLIC BLOOD PRESSURE: 86 MMHG

## 2023-05-02 DIAGNOSIS — I10 ESSENTIAL HYPERTENSION, BENIGN: Primary | ICD-10-CM

## 2023-05-02 DIAGNOSIS — G43.719 INTRACTABLE CHRONIC MIGRAINE WITHOUT AURA AND WITHOUT STATUS MIGRAINOSUS: ICD-10-CM

## 2023-05-02 DIAGNOSIS — E78.2 MIXED HYPERLIPIDEMIA: Chronic | ICD-10-CM

## 2023-05-02 PROBLEM — R07.9 CHEST PAIN IN ADULT: Status: RESOLVED | Noted: 2023-01-11 | Resolved: 2023-05-02

## 2023-05-02 PROCEDURE — 3079F DIAST BP 80-89 MM HG: CPT | Performed by: NURSE PRACTITIONER

## 2023-05-02 PROCEDURE — 3074F SYST BP LT 130 MM HG: CPT | Performed by: NURSE PRACTITIONER

## 2023-05-02 PROCEDURE — 99214 OFFICE O/P EST MOD 30 MIN: CPT | Performed by: NURSE PRACTITIONER

## 2023-05-02 RX ORDER — LANSOPRAZOLE 15 MG/1
15 CAPSULE, DELAYED RELEASE ORAL DAILY
Qty: 90 CAPSULE | Refills: 1 | Status: SHIPPED | OUTPATIENT
Start: 2023-05-02

## 2023-05-02 RX ORDER — BUTALBITAL/ACETAMINOPHEN 50MG-325MG
1 TABLET ORAL EVERY 4 HOURS PRN
Qty: 30 TABLET | Refills: 0 | Status: SHIPPED | OUTPATIENT
Start: 2023-05-02 | End: 2023-06-01

## 2023-05-02 RX ORDER — INCLISIRAN 284 MG/1.5ML
284 INJECTION, SOLUTION SUBCUTANEOUS ONCE
Qty: 1.5 ML | Refills: 0 | Status: SHIPPED | OUTPATIENT
Start: 2023-05-02 | End: 2023-05-02

## 2023-05-02 ASSESSMENT — PATIENT HEALTH QUESTIONNAIRE - PHQ9
SUM OF ALL RESPONSES TO PHQ9 QUESTIONS 1 & 2: 0
SUM OF ALL RESPONSES TO PHQ QUESTIONS 1-9: 0
SUM OF ALL RESPONSES TO PHQ QUESTIONS 1-9: 0
1. LITTLE INTEREST OR PLEASURE IN DOING THINGS: 0
SUM OF ALL RESPONSES TO PHQ QUESTIONS 1-9: 0
2. FEELING DOWN, DEPRESSED OR HOPELESS: 0
SUM OF ALL RESPONSES TO PHQ QUESTIONS 1-9: 0

## 2023-05-02 NOTE — ASSESSMENT & PLAN NOTE
Chronic and uncontrolled. She will continue with Fioricet daily as needed. She can only get 30 pills a month. We have discussed this previously. She is going to try to get Aimovig covered again with her switch to [employer].

## 2023-05-02 NOTE — PROGRESS NOTES
Jimena Cardenas (:  1960) is a 65 y.o. female,Established patient, here for evaluation of the following chief complaint(s):  Medication Check      ASSESSMENT/PLAN:  1. Essential hypertension, benign  Assessment & Plan:  Well-controlled  [place]    2. Intractable chronic migraine without aura and without status migrainosus  Assessment & Plan:  Chronic and uncontrolled. She will continue with Fioricet daily as needed. She can only get 30 pills a month. We have discussed this previously. She is going to try to get Aimovig covered again with her switch to [employer]. Orders:  -     acetaminophen-butalbital  MG TABS; Take 1 tablet by mouth every 4 hours as needed for Migraine, Disp-30 tablet, R-0Normal  3. Mixed hyperlipidemia  Assessment & Plan:  Did not tolerate statins. Had significant myopathic pain. Failed Repatha as well. The 10-year ASCVD risk score (Elizabeth OSUNA, et al., 2019) is: 6.7%    Values used to calculate the score:      Age: 65 years      Sex: Female      Is Non- : Yes      Diabetic: No      Tobacco smoker: No      Systolic Blood Pressure: 608 mmHg      Is BP treated: Yes      HDL Cholesterol: 35 mg/dL      Total Cholesterol: 157 mg/dL  Given her history of cardiac stent she is at high risk for recurrent cardiovascular disease. We will try to get leg feel covered. Patient is interested in this. No follow-ups on file. SUBJECTIVE/OBJECTIVE:  Patient presents for follow-up on migraine, GERD, HLD. States since her insurance is changed she no longer is able to get her Aimovig injections for her migraine therefore they have returned. She is taking butalbital-Fioricet at least once daily to help control her migraine. She has been on this for many years and has been unable to get off of it. She did have some relief while she was taking Aimovig only required to medicate for breakthrough headaches maybe once a week.   Her plan is to get back on

## 2023-05-02 NOTE — ASSESSMENT & PLAN NOTE
Did not tolerate statins. Had significant myopathic pain. Failed Repatha as well. The 10-year ASCVD risk score (Elizabeth OSUNA, et al., 2019) is: 6.7%    Values used to calculate the score:      Age: 65 years      Sex: Female      Is Non- : Yes      Diabetic: No      Tobacco smoker: No      Systolic Blood Pressure: 822 mmHg      Is BP treated: Yes      HDL Cholesterol: 35 mg/dL      Total Cholesterol: 157 mg/dL  Given her history of cardiac stent she is at high risk for recurrent cardiovascular disease. We will try to get leg feel covered. Patient is interested in this.

## 2023-05-09 ENCOUNTER — PATIENT MESSAGE (OUTPATIENT)
Dept: FAMILY MEDICINE CLINIC | Age: 63
End: 2023-05-09

## 2023-05-12 ENCOUNTER — OFFICE VISIT (OUTPATIENT)
Dept: CARDIOLOGY CLINIC | Age: 63
End: 2023-05-12
Payer: MEDICAID

## 2023-05-12 VITALS
SYSTOLIC BLOOD PRESSURE: 136 MMHG | DIASTOLIC BLOOD PRESSURE: 78 MMHG | HEART RATE: 78 BPM | WEIGHT: 205.2 LBS | BODY MASS INDEX: 35.22 KG/M2

## 2023-05-12 DIAGNOSIS — E78.2 MIXED HYPERLIPIDEMIA: Primary | Chronic | ICD-10-CM

## 2023-05-12 DIAGNOSIS — R00.0 TACHYCARDIA: ICD-10-CM

## 2023-05-12 PROCEDURE — 99214 OFFICE O/P EST MOD 30 MIN: CPT | Performed by: INTERNAL MEDICINE

## 2023-05-12 PROCEDURE — 93000 ELECTROCARDIOGRAM COMPLETE: CPT | Performed by: INTERNAL MEDICINE

## 2023-05-12 PROCEDURE — 3075F SYST BP GE 130 - 139MM HG: CPT | Performed by: INTERNAL MEDICINE

## 2023-05-12 PROCEDURE — 3078F DIAST BP <80 MM HG: CPT | Performed by: INTERNAL MEDICINE

## 2023-05-12 RX ORDER — ROSUVASTATIN CALCIUM 20 MG/1
20 TABLET, COATED ORAL DAILY
Qty: 90 TABLET | Refills: 1 | Status: SHIPPED | OUTPATIENT
Start: 2023-05-12

## 2023-05-12 RX ORDER — NADOLOL 20 MG/1
20 TABLET ORAL 2 TIMES DAILY
Qty: 180 TABLET | Refills: 3 | Status: SHIPPED | OUTPATIENT
Start: 2023-05-12

## 2023-05-12 NOTE — PROGRESS NOTES
[facility] [facility]patient- Progress Note          CC:  follow up CP and RCA stenting  (1 SHARON)    Subjective:  Ms. Kong Rivers has leg pain from lipitor. CP is resolved. Objective:   /78   Pulse 78   Wt 205 lb 3.2 oz (93.1 kg)   BMI 35.22 kg/m²   No intake or output data in the 24 hours ending 05/12/23 1627    Sinus clinically    Physical Exam:  General:  Awake, alert, NAD  Eyes:  EOMI PERRL anicteric  Skin:  Warm and dry. Neck:  JVP normal  Chest:  Diminshed. No Rales. Cardiovascular:  RRR, Normal S1S2. No Murmur. No Rub. No Gallops. Abdomen:  Soft NT  + bs  Extremities:  No edema  Neuro:  CN II-XII intact. No focal deficits. No weakness. No lateralizing findings. Psych:  Normal thought and affect. MSK:  No Cyanosis nor Clubbing. Symmetrical strength upper and lower extremities     Diagnosis Orders   1. Mixed hyperlipidemia  EKG 12 Lead      2. Tachycardia  EKG 12 Lead              Assessment:  Patient Active Problem List    Diagnosis Date Noted    Elevated glucose 09/09/2021    Chest pain 04/22/2016    DDD (degenerative disc disease), lumbar 01/16/2012    Hyperlipidemia 07/07/2011    Intractable migraine 02/02/2007    Essential hypertension, benign 01/08/2007       Plan:  CAD:  doing well after RCA stent x 1 but has leg pain and fatigue  HTN:   Palpitations:  takes nadolol 20 mg bid. 3.  HLP:  ldl 94 so increase 20 mg daily rosuvastatin.     Core Measures:  Discharge instructions:   LVEF documented:   ACEI for LV dysfunction:   Smoking Cessation:    Adriana Farr MD, MD 5/12/2023 4:27 PM

## 2023-05-16 ENCOUNTER — TELEPHONE (OUTPATIENT)
Dept: FAMILY MEDICINE CLINIC | Age: 63
End: 2023-05-16

## 2023-06-01 ENCOUNTER — TELEPHONE (OUTPATIENT)
Dept: FAMILY MEDICINE CLINIC | Age: 63
End: 2023-06-01

## 2023-06-02 NOTE — TELEPHONE ENCOUNTER
Submitted PA for Repatha 140MG/ML syringes  Via [facility] Key: [id number] STATUS: PENDING. Follow up done daily; if no response in three days we will refax for status check. If another three days goes by with no response we will call the insurance for status.

## 2023-06-07 DIAGNOSIS — G43.719 INTRACTABLE CHRONIC MIGRAINE WITHOUT AURA AND WITHOUT STATUS MIGRAINOSUS: ICD-10-CM

## 2023-06-07 NOTE — TELEPHONE ENCOUNTER
Requested Prescriptions     Pending Prescriptions Disp Refills    acetaminophen-butalbital  MG TABS 30 tablet 0     Sig: Take 1 tablet by mouth every 4 hours as needed for Migraine          Last Office Visit: 5/2/2023     Next Office Visit: Visit date not found     Last Labs:  3/15/23

## 2023-06-08 RX ORDER — BUTALBITAL/ACETAMINOPHEN 50MG-325MG
1 TABLET ORAL EVERY 4 HOURS PRN
Qty: 30 TABLET | Refills: 0 | Status: SHIPPED | OUTPATIENT
Start: 2023-06-08 | End: 2023-07-08

## 2023-06-13 ENCOUNTER — TELEPHONE (OUTPATIENT)
Dept: FAMILY MEDICINE CLINIC | Age: 63
End: 2023-06-13

## 2023-06-15 DIAGNOSIS — E78.2 MIXED HYPERLIPIDEMIA: ICD-10-CM

## 2023-06-15 DIAGNOSIS — I10 ESSENTIAL HYPERTENSION, BENIGN: ICD-10-CM

## 2023-06-15 LAB
ALBUMIN SERPL-MCNC: 4.2 G/DL (ref 3.4–5)
ALBUMIN/GLOB SERPL: 1.4 {RATIO} (ref 1.1–2.2)
ALP SERPL-CCNC: 60 U/L (ref 40–129)
ALT SERPL-CCNC: 17 U/L (ref 10–40)
ANION GAP SERPL CALCULATED.3IONS-SCNC: 13 MMOL/L (ref 3–16)
AST SERPL-CCNC: 16 U/L (ref 15–37)
BILIRUB SERPL-MCNC: <0.2 MG/DL (ref 0–1)
BUN SERPL-MCNC: 16 MG/DL (ref 7–20)
CALCIUM SERPL-MCNC: 9.5 MG/DL (ref 8.3–10.6)
CHLORIDE SERPL-SCNC: 102 MMOL/L (ref 99–110)
CHOLEST SERPL-MCNC: 243 MG/DL (ref 0–199)
CK SERPL-CCNC: 131 U/L (ref 26–192)
CO2 SERPL-SCNC: 24 MMOL/L (ref 21–32)
CREAT SERPL-MCNC: 0.8 MG/DL (ref 0.6–1.2)
GFR SERPLBLD CREATININE-BSD FMLA CKD-EPI: >60 ML/MIN/{1.73_M2}
GLUCOSE SERPL-MCNC: 106 MG/DL (ref 70–99)
HDLC SERPL-MCNC: 44 MG/DL (ref 40–60)
LDLC SERPL CALC-MCNC: 159 MG/DL
POTASSIUM SERPL-SCNC: 4.8 MMOL/L (ref 3.5–5.1)
PROT SERPL-MCNC: 7.2 G/DL (ref 6.4–8.2)
SODIUM SERPL-SCNC: 139 MMOL/L (ref 136–145)
TRIGL SERPL-MCNC: 199 MG/DL (ref 0–150)
VLDLC SERPL CALC-MCNC: 40 MG/DL

## 2023-07-13 LAB
AVERAGE GLUCOSE: 114
HBA1C MFR BLD: 5.6 %

## 2023-07-26 DIAGNOSIS — G43.719 INTRACTABLE CHRONIC MIGRAINE WITHOUT AURA AND WITHOUT STATUS MIGRAINOSUS: ICD-10-CM

## 2023-07-26 RX ORDER — BUTALBITAL/ACETAMINOPHEN 50MG-325MG
1 TABLET ORAL EVERY 4 HOURS PRN
Qty: 30 TABLET | Refills: 0 | OUTPATIENT
Start: 2023-07-26 | End: 2023-08-25

## 2023-07-26 RX ORDER — BUTALBITAL, ACETAMINOPHEN, CAFFEINE AND CODEINE PHOSPHATE 300; 50; 40; 30 MG/1; MG/1; MG/1; MG/1
1 CAPSULE ORAL EVERY 4 HOURS PRN
OUTPATIENT
Start: 2023-07-26 | End: 2023-08-05

## 2023-07-26 NOTE — TELEPHONE ENCOUNTER
Requested Prescriptions     Pending Prescriptions Disp Refills    acetaminophen-butalbital  MG TABS 30 tablet 0     Sig: Take 1 tablet by mouth every 4 hours as needed for Migraine          Last Office Visit: 6/15/2023     Next Office Visit: Visit date not found     Last Labs: 6/15/23      Patient comment: Please may I have capsules?

## 2023-07-27 RX ORDER — ATOGEPANT 30 MG/1
30 TABLET ORAL DAILY
Qty: 30 TABLET | Refills: 5 | Status: SHIPPED | OUTPATIENT
Start: 2023-07-27

## 2023-08-02 ENCOUNTER — TELEPHONE (OUTPATIENT)
Dept: FAMILY MEDICINE CLINIC | Age: 63
End: 2023-08-02

## 2023-08-02 RX ORDER — ATOGEPANT 30 MG/1
30 TABLET ORAL DAILY
Qty: 30 TABLET | Refills: 5 | Status: CANCELLED | OUTPATIENT
Start: 2023-08-02

## 2023-08-02 NOTE — TELEPHONE ENCOUNTER
Can a PA be started for this med please. Continuation of med pt has been on this since 8/22. Pt has new insurance now.

## 2023-08-03 RX ORDER — EVOLOCUMAB 140 MG/ML
INJECTION, SOLUTION SUBCUTANEOUS
Qty: 2 ML | Refills: 0 | Status: SHIPPED | OUTPATIENT
Start: 2023-08-03

## 2023-08-05 ENCOUNTER — HOSPITAL ENCOUNTER (OUTPATIENT)
Dept: MAMMOGRAPHY | Age: 63
Discharge: HOME | End: 2023-08-05
Payer: MEDICAID

## 2023-08-05 VITALS — HEIGHT: 64 IN | BODY MASS INDEX: 34.49 KG/M2 | WEIGHT: 202 LBS

## 2023-08-05 DIAGNOSIS — Z12.31 VISIT FOR SCREENING MAMMOGRAM: ICD-10-CM

## 2023-08-05 PROCEDURE — 77067 SCR MAMMO BI INCL CAD: CPT

## 2023-08-08 NOTE — TELEPHONE ENCOUNTER
----- Message from George Duverney sent at 8/8/2023 10:46 AM EDT -----  Subject: Medication Problem    Medication: Atogepant (QULIPTA) 30 MG TABS  Dosage: Take 30 mg by mouth daily  Ordering Provider: Rody Short    Question/Problem: Vimal from [address] [place] [address] called on 8/8/2023 to follow   up on a prior authorization sent a week ago for this medication. Pharmacy: [id number], [address] [place] [address] [address] [phone]    ---------------------------------------------------------------------------  --------------  Adeola PALMER  [phone]; OK to leave message on voicemail  ---------------------------------------------------------------------------  --------------    SCRIPT ANSWERS  Relationship to Patient: Covered Entity  Covered Entity Type: Pharmacy?   Representative Name: Sandy Lovell

## 2023-08-09 RX ORDER — ATOGEPANT 30 MG/1
30 TABLET ORAL DAILY
Qty: 30 TABLET | Refills: 5 | Status: SHIPPED | OUTPATIENT
Start: 2023-08-09

## 2023-08-30 RX ORDER — EVOLOCUMAB 140 MG/ML
INJECTION, SOLUTION SUBCUTANEOUS
Qty: 2 ML | Refills: 0 | Status: SHIPPED | OUTPATIENT
Start: 2023-08-30

## 2023-08-31 ENCOUNTER — TELEPHONE (OUTPATIENT)
Dept: ORTHOPEDIC SURGERY | Age: 63
End: 2023-08-31

## 2023-08-31 NOTE — TELEPHONE ENCOUNTER
Submitted PA for Qulipta 30MG tablets  Via [facility] Key: [id number] STATUS: PENDING. Follow up done daily; if no response in three days we will refax for status check. If another three days goes by with no response we will call the insurance for status.

## 2023-09-01 NOTE — TELEPHONE ENCOUNTER
Received APPROVAL for Qulipta 30MG tablets from 08/31/23 - 02/26/24; approval letter attached. If this requires a response please respond to the pool ( [id number]). Thank you please advise patient.

## 2023-09-29 RX ORDER — EVOLOCUMAB 140 MG/ML
INJECTION, SOLUTION SUBCUTANEOUS
Qty: 2 ML | Refills: 0 | Status: SHIPPED | OUTPATIENT
Start: 2023-09-29

## 2023-10-02 RX ORDER — EVOLOCUMAB 140 MG/ML
INJECTION, SOLUTION SUBCUTANEOUS
Qty: 2 ML | Refills: 0 | OUTPATIENT
Start: 2023-10-02

## 2023-10-06 RX ORDER — EVOLOCUMAB 140 MG/ML
140 INJECTION, SOLUTION SUBCUTANEOUS
Qty: 2 ML | Refills: 20 | Status: SHIPPED | OUTPATIENT
Start: 2023-10-06

## 2023-10-24 RX ORDER — MECOBALAMIN 5000 MCG
TABLET,DISINTEGRATING ORAL DAILY
Qty: 90 CAPSULE | Refills: 0 | Status: SHIPPED | OUTPATIENT
Start: 2023-10-24

## 2023-10-24 NOTE — TELEPHONE ENCOUNTER
Requested Prescriptions     Pending Prescriptions Disp Refills    lansoprazole (PREVACID) 15 MG delayed release capsule [Pharmacy Med Name: Lansoprazole Oral Capsule Delayed Release 15 MG] 90 capsule 0     Sig: TAKE 1 CAPSULE BY MOUTH EVERY DAY          Last Office Visit: 6/15/2023     Next Office Visit: Visit date not found     Last Labs: 6/15/2023

## 2023-11-11 ENCOUNTER — APPOINTMENT (OUTPATIENT)
Dept: CT IMAGING | Age: 63
End: 2023-11-11
Payer: MEDICAID

## 2023-11-11 ENCOUNTER — HOSPITAL ENCOUNTER (EMERGENCY)
Age: 63
Discharge: HOME | End: 2023-11-12
Attending: EMERGENCY MEDICINE
Payer: MEDICAID

## 2023-11-11 DIAGNOSIS — G43.009 ATYPICAL MIGRAINE: Primary | ICD-10-CM

## 2023-11-11 LAB
ANION GAP SERPL CALCULATED.3IONS-SCNC: 15 MMOL/L (ref 3–16)
BUN SERPL-MCNC: 15 MG/DL (ref 7–20)
CALCIUM SERPL-MCNC: 9 MG/DL (ref 8.3–10.6)
CHLORIDE SERPL-SCNC: 102 MMOL/L (ref 99–110)
CO2 SERPL-SCNC: 22 MMOL/L (ref 21–32)
CREAT SERPL-MCNC: 0.7 MG/DL (ref 0.6–1.2)
GFR SERPLBLD CREATININE-BSD FMLA CKD-EPI: >60 ML/MIN/{1.73_M2}
GLUCOSE SERPL-MCNC: 117 MG/DL (ref 70–99)
POTASSIUM SERPL-SCNC: 4.6 MMOL/L (ref 3.5–5.1)
SODIUM SERPL-SCNC: 139 MMOL/L (ref 136–145)

## 2023-11-11 PROCEDURE — 2580000003 HC RX 258: Performed by: EMERGENCY MEDICINE

## 2023-11-11 PROCEDURE — 6360000002 HC RX W HCPCS: Performed by: EMERGENCY MEDICINE

## 2023-11-11 PROCEDURE — 99285 EMERGENCY DEPT VISIT HI MDM: CPT

## 2023-11-11 PROCEDURE — 96374 THER/PROPH/DIAG INJ IV PUSH: CPT

## 2023-11-11 PROCEDURE — 96375 TX/PRO/DX INJ NEW DRUG ADDON: CPT

## 2023-11-11 PROCEDURE — 6360000004 HC RX CONTRAST MEDICATION: Performed by: EMERGENCY MEDICINE

## 2023-11-11 PROCEDURE — 80048 BASIC METABOLIC PNL TOTAL CA: CPT

## 2023-11-11 RX ORDER — PROCHLORPERAZINE EDISYLATE 5 MG/ML
10 INJECTION INTRAMUSCULAR; INTRAVENOUS ONCE
Status: COMPLETED | OUTPATIENT
Start: 2023-11-11 | End: 2023-11-11

## 2023-11-11 RX ORDER — 0.9 % SODIUM CHLORIDE 0.9 %
1000 INTRAVENOUS SOLUTION INTRAVENOUS ONCE
Status: COMPLETED | OUTPATIENT
Start: 2023-11-11 | End: 2023-11-12

## 2023-11-11 RX ORDER — KETOROLAC TROMETHAMINE 30 MG/ML
15 INJECTION, SOLUTION INTRAMUSCULAR; INTRAVENOUS ONCE
Status: COMPLETED | OUTPATIENT
Start: 2023-11-11 | End: 2023-11-11

## 2023-11-11 RX ORDER — DIPHENHYDRAMINE HYDROCHLORIDE 50 MG/ML
25 INJECTION INTRAMUSCULAR; INTRAVENOUS ONCE
Status: COMPLETED | OUTPATIENT
Start: 2023-11-11 | End: 2023-11-11

## 2023-11-11 RX ADMIN — KETOROLAC TROMETHAMINE 15 MG: 30 INJECTION, SOLUTION INTRAMUSCULAR; INTRAVENOUS at 23:08

## 2023-11-11 RX ADMIN — DIPHENHYDRAMINE HYDROCHLORIDE 25 MG: 50 INJECTION INTRAMUSCULAR; INTRAVENOUS at 23:09

## 2023-11-11 RX ADMIN — IOPAMIDOL 75 ML: 755 INJECTION, SOLUTION INTRAVENOUS at 23:44

## 2023-11-11 RX ADMIN — PROCHLORPERAZINE EDISYLATE 10 MG: 5 INJECTION INTRAMUSCULAR; INTRAVENOUS at 23:09

## 2023-11-11 RX ADMIN — SODIUM CHLORIDE 1000 ML: 9 INJECTION, SOLUTION INTRAVENOUS at 23:10

## 2023-11-11 ASSESSMENT — PAIN - FUNCTIONAL ASSESSMENT
PAIN_FUNCTIONAL_ASSESSMENT: PREVENTS OR INTERFERES SOME ACTIVE ACTIVITIES AND ADLS
PAIN_FUNCTIONAL_ASSESSMENT: 0-10

## 2023-11-11 ASSESSMENT — PAIN DESCRIPTION - DESCRIPTORS: DESCRIPTORS: DISCOMFORT;CRUSHING

## 2023-11-11 ASSESSMENT — LIFESTYLE VARIABLES
HOW OFTEN DO YOU HAVE A DRINK CONTAINING ALCOHOL: NEVER
HOW MANY STANDARD DRINKS CONTAINING ALCOHOL DO YOU HAVE ON A TYPICAL DAY: PATIENT DOES NOT DRINK

## 2023-11-11 ASSESSMENT — PAIN DESCRIPTION - ONSET: ONSET: ON-GOING

## 2023-11-11 ASSESSMENT — PAIN SCALES - GENERAL: PAINLEVEL_OUTOF10: 10

## 2023-11-11 ASSESSMENT — PAIN DESCRIPTION - PAIN TYPE: TYPE: ACUTE PAIN

## 2023-11-11 ASSESSMENT — PAIN DESCRIPTION - LOCATION: LOCATION: HEAD

## 2023-11-11 ASSESSMENT — PAIN DESCRIPTION - FREQUENCY: FREQUENCY: CONTINUOUS

## 2023-11-12 VITALS
TEMPERATURE: 98.9 F | OXYGEN SATURATION: 100 % | RESPIRATION RATE: 18 BRPM | WEIGHT: 215.3 LBS | HEIGHT: 64 IN | HEART RATE: 50 BPM | SYSTOLIC BLOOD PRESSURE: 149 MMHG | BODY MASS INDEX: 36.76 KG/M2 | DIASTOLIC BLOOD PRESSURE: 72 MMHG

## 2023-11-12 PROCEDURE — 96375 TX/PRO/DX INJ NEW DRUG ADDON: CPT

## 2023-11-12 PROCEDURE — 6360000002 HC RX W HCPCS: Performed by: EMERGENCY MEDICINE

## 2023-11-12 PROCEDURE — 96374 THER/PROPH/DIAG INJ IV PUSH: CPT

## 2023-11-12 RX ORDER — DEXAMETHASONE SODIUM PHOSPHATE 10 MG/ML
INJECTION, SOLUTION INTRAMUSCULAR; INTRAVENOUS
Status: DISCONTINUED
Start: 2023-11-12 | End: 2023-11-12 | Stop reason: HOSPADM

## 2023-11-12 RX ORDER — DEXAMETHASONE SODIUM PHOSPHATE 10 MG/ML
6 INJECTION, SOLUTION INTRAMUSCULAR; INTRAVENOUS ONCE
Status: COMPLETED | OUTPATIENT
Start: 2023-11-12 | End: 2023-11-12

## 2023-11-12 RX ADMIN — DEXAMETHASONE SODIUM PHOSPHATE 6 MG: 10 INJECTION, SOLUTION INTRAMUSCULAR; INTRAVENOUS at 00:58

## 2023-11-12 ASSESSMENT — PAIN SCALES - GENERAL
PAINLEVEL_OUTOF10: 10
PAINLEVEL_OUTOF10: 8

## 2023-11-12 NOTE — ED PROVIDER NOTES
[facility]          ATTENDING PHYSICIAN NOTE       Date of evaluation: 11/11/2023    Chief Complaint     Headache (Pt c/o huge headache that started yesterday)      History of Present Illness     Deborah Maurer is a 65 y.o. female who presents with complaints a migraine headache that began 1 day ago. The headache is rated as 10/10, is described as throbbing, and is not typical of her usual migraine headaches because of the intensity and duration. The patient has not had one as severe for over a year because of the newer migraine medications that she has been placed on. The patient's headache is located mostly on the right side and radiates somewhat to the left and to the neck. The patient has been taking combination Tylenol migraine medication as well as her baseline migraine medications (weekly Aimovig and daily Tee Huger) at home without sufficient relief. She has associated nausea, vomiting and light sensitivity. The patient denies any fever or neck stiffness. She denies any numbness or tingling. An aura of blurry vision which is usual of her migraine headaches is reported by the patient and this began yesterday afternoon while she was at work. She knew she was going to develop a headache and therefore went home and took medications and went to bed. The current migraine woke her up and has gradually gotten worse. It improved somewhat during the day earlier but then it continued to get worse and that is why she came here. Review of Systems     Denies fever or chills. Denies weakness or numbness. All other systems negative.     Past Medical, Surgical, Family, and Social History     She has a past medical history of CAD S/P percutaneous coronary angioplasty, Essential hypertension, benign, GERD (gastroesophageal reflux disease), Hyperlipidemia, Migraine, Osteoarthritis of right knee, Pyriformis syndrome, Screening mammogram for high-risk patient, and

## 2023-11-13 ENCOUNTER — PATIENT MESSAGE (OUTPATIENT)
Dept: FAMILY MEDICINE CLINIC | Age: 63
End: 2023-11-13

## 2023-11-13 DIAGNOSIS — G43.719 INTRACTABLE CHRONIC MIGRAINE WITHOUT AURA AND WITHOUT STATUS MIGRAINOSUS: ICD-10-CM

## 2023-11-13 RX ORDER — BUTALBITAL/ACETAMINOPHEN 50MG-325MG
1 TABLET ORAL EVERY 4 HOURS PRN
Qty: 30 TABLET | Refills: 0 | Status: SHIPPED | OUTPATIENT
Start: 2023-11-13 | End: 2023-12-18

## 2023-11-13 NOTE — TELEPHONE ENCOUNTER
From: Deborah Maurer  To: Ck Oconnor  Sent: 11/13/2023 12:11 PM EST  Subject: medication request     Hi Lazara. Can you please fill the butabital capsules. I have had a severe migraine since late Thursday. Nothing has helped not even a trip to the ER. I got a short reprieve after the visit, but the headache has presist. New job and can't miss 1st 90 day probation. Thank you.   [place]

## 2023-11-13 NOTE — TELEPHONE ENCOUNTER
Requested Prescriptions     Pending Prescriptions Disp Refills    acetaminophen-butalbital  MG TABS 30 tablet 0     Sig: Take 1 tablet by mouth every 4 hours as needed for Migraine          Last Office Visit: 6/15/2023     Next Office Visit: Visit date not found     Last Labs: 11/11/2023

## 2023-11-14 ENCOUNTER — TELEPHONE (OUTPATIENT)
Dept: FAMILY MEDICINE CLINIC | Age: 63
End: 2023-11-14

## 2023-11-14 RX ORDER — BUTALBITAL, ACETAMINOPHEN AND CAFFEINE 50; 325; 40 MG/1; MG/1; MG/1
28 CAPSULE ORAL 2 TIMES DAILY PRN
Qty: 28 CAPSULE | Refills: 0 | Status: SHIPPED | OUTPATIENT
Start: 2023-11-14

## 2023-11-14 NOTE — TELEPHONE ENCOUNTER
Pharmacy called for clarification on r/x sent today forbutalbital-apap-caffeine (ESGIC) -40 MG CAPS. Also a r/x was sent in yesterday for the  acetaminophen-butalbital  MG TABS so pharmacy is unsure of which to fill. If the one from today please check dosage amount and correct it, thanks.        [id number] - [address] [place] [phone] - F [phone]

## 2023-11-16 ENCOUNTER — COMMUNITY OUTREACH (OUTPATIENT)
Dept: FAMILY MEDICINE CLINIC | Age: 63
End: 2023-11-16

## 2023-12-18 DIAGNOSIS — R73.09 ELEVATED GLUCOSE: ICD-10-CM

## 2023-12-18 DIAGNOSIS — E78.2 MIXED HYPERLIPIDEMIA: ICD-10-CM

## 2023-12-18 PROBLEM — E66.01 CLASS 2 SEVERE OBESITY DUE TO EXCESS CALORIES WITH SERIOUS COMORBIDITY IN ADULT (HCC): Status: ACTIVE | Noted: 2023-12-18

## 2023-12-18 PROBLEM — M79.671 PAIN IN BOTH FEET: Status: ACTIVE | Noted: 2023-12-18

## 2023-12-18 PROBLEM — M79.672 PAIN IN BOTH FEET: Status: ACTIVE | Noted: 2023-12-18

## 2023-12-18 PROBLEM — E66.812 CLASS 2 SEVERE OBESITY DUE TO EXCESS CALORIES WITH SERIOUS COMORBIDITY IN ADULT: Status: ACTIVE | Noted: 2023-12-18

## 2023-12-19 LAB
ALBUMIN SERPL-MCNC: 4.4 G/DL (ref 3.4–5)
ALBUMIN/GLOB SERPL: 1.6 {RATIO} (ref 1.1–2.2)
ALP SERPL-CCNC: 59 U/L (ref 40–129)
ALT SERPL-CCNC: 12 U/L (ref 10–40)
ANION GAP SERPL CALCULATED.3IONS-SCNC: 12 MMOL/L (ref 3–16)
AST SERPL-CCNC: 17 U/L (ref 15–37)
BASOPHILS # BLD: 0.1 K/UL (ref 0–0.2)
BASOPHILS NFR BLD: 1 %
BILIRUB SERPL-MCNC: <0.2 MG/DL (ref 0–1)
BUN SERPL-MCNC: 16 MG/DL (ref 7–20)
CALCIUM SERPL-MCNC: 9 MG/DL (ref 8.3–10.6)
CHLORIDE SERPL-SCNC: 103 MMOL/L (ref 99–110)
CHOLEST SERPL-MCNC: 127 MG/DL (ref 0–199)
CO2 SERPL-SCNC: 25 MMOL/L (ref 21–32)
CREAT SERPL-MCNC: 0.7 MG/DL (ref 0.6–1.2)
DEPRECATED RDW RBC AUTO: 13.7 % (ref 12.4–15.4)
EOSINOPHIL # BLD: 0.2 K/UL (ref 0–0.6)
EOSINOPHIL NFR BLD: 3 %
GFR SERPLBLD CREATININE-BSD FMLA CKD-EPI: >60 ML/MIN/{1.73_M2}
GLUCOSE SERPL-MCNC: 108 MG/DL (ref 70–99)
HCT VFR BLD AUTO: 38.3 % (ref 36–48)
HDLC SERPL-MCNC: 46 MG/DL (ref 40–60)
HGB BLD-MCNC: 12.6 G/DL (ref 12–16)
LDLC SERPL CALC-MCNC: 47 MG/DL
LYMPHOCYTES # BLD: 2.3 K/UL (ref 1–5.1)
LYMPHOCYTES NFR BLD: 38.1 %
MCH RBC QN AUTO: 27.5 PG (ref 26–34)
MCHC RBC AUTO-ENTMCNC: 32.8 G/DL (ref 31–36)
MCV RBC AUTO: 83.8 FL (ref 80–100)
MONOCYTES # BLD: 0.5 K/UL (ref 0–1.3)
MONOCYTES NFR BLD: 8.1 %
NEUTROPHILS # BLD: 3 K/UL (ref 1.7–7.7)
NEUTROPHILS NFR BLD: 49.8 %
PLATELET # BLD AUTO: 170 K/UL (ref 135–450)
PMV BLD AUTO: 10 FL (ref 5–10.5)
POTASSIUM SERPL-SCNC: 4.7 MMOL/L (ref 3.5–5.1)
PROT SERPL-MCNC: 7.1 G/DL (ref 6.4–8.2)
RBC # BLD AUTO: 4.58 M/UL (ref 4–5.2)
SODIUM SERPL-SCNC: 140 MMOL/L (ref 136–145)
TRIGL SERPL-MCNC: 172 MG/DL (ref 0–150)
VLDLC SERPL CALC-MCNC: 34 MG/DL
WBC # BLD AUTO: 6 K/UL (ref 4–11)

## 2023-12-20 LAB
EST. AVERAGE GLUCOSE BLD GHB EST-MCNC: 122.6 MG/DL
HBA1C MFR BLD: 5.9 %

## 2024-01-29 RX ORDER — MECOBALAMIN 5000 MCG
TABLET,DISINTEGRATING ORAL DAILY
Qty: 90 CAPSULE | Refills: 1 | Status: SHIPPED | OUTPATIENT
Start: 2024-01-29

## 2024-01-29 NOTE — TELEPHONE ENCOUNTER
Last ov 12-18-23  No future appt      Requested Prescriptions     Pending Prescriptions Disp Refills    lansoprazole (PREVACID) 15 MG delayed release capsule [Pharmacy Med Name: Lansoprazole Oral Capsule Delayed Release 15 MG] 90 capsule 1     Sig: TAKE 1 CAPSULE BY MOUTH EVERY DAY

## 2024-02-12 RX ORDER — BUTALBITAL, ACETAMINOPHEN AND CAFFEINE 50; 325; 40 MG/1; MG/1; MG/1
1 CAPSULE ORAL 2 TIMES DAILY PRN
Qty: 28 CAPSULE | Refills: 0 | Status: SHIPPED | OUTPATIENT
Start: 2024-02-12

## 2024-02-12 NOTE — TELEPHONE ENCOUNTER
Requested Prescriptions     Pending Prescriptions Disp Refills    butalbital-apap-caffeine (ESGIC) -40 MG CAPS 28 capsule 0     Sig: Take 1 capsule by mouth 2 times daily as needed for Headaches or Migraine          Last Office Visit: 12/18/2023     Next Office Visit: Visit date not found

## 2024-04-22 RX ORDER — BUTALBITAL, ACETAMINOPHEN AND CAFFEINE 50; 325; 40 MG/1; MG/1; MG/1
1 CAPSULE ORAL 2 TIMES DAILY PRN
Qty: 28 CAPSULE | Refills: 0 | Status: SHIPPED | OUTPATIENT
Start: 2024-04-22

## 2024-04-29 ENCOUNTER — PATIENT MESSAGE (OUTPATIENT)
Dept: FAMILY MEDICINE CLINIC | Age: 64
End: 2024-04-29

## 2024-04-29 RX ORDER — DOXYCYCLINE HYCLATE 100 MG
100 TABLET ORAL 2 TIMES DAILY
Qty: 20 TABLET | Refills: 0 | Status: SHIPPED | OUTPATIENT
Start: 2024-04-29 | End: 2024-05-09

## 2024-06-12 RX ORDER — BUTALBITAL, ACETAMINOPHEN AND CAFFEINE 50; 325; 40 MG/1; MG/1; MG/1
1 CAPSULE ORAL 2 TIMES DAILY PRN
Qty: 28 CAPSULE | Refills: 0 | Status: SHIPPED | OUTPATIENT
Start: 2024-06-12

## 2024-07-01 RX ORDER — ERENUMAB-AOOE 140 MG/ML
INJECTION, SOLUTION SUBCUTANEOUS
Qty: 3 ML | Refills: 0 | Status: SHIPPED | OUTPATIENT
Start: 2024-07-01

## 2024-07-01 NOTE — TELEPHONE ENCOUNTER
/46 (BP Location: Right arm)   Pulse 88   Temp 98.1  F (36.7  C) (Oral)   Resp 16   Wt 50.2 kg (110 lb 11.2 oz)   SpO2 100%   BMI 20.25 kg/m      [id number]    Patient A&Ox4, on room air, intermittent hypotensive but OVSS. Pain stated 5/10, oxycodone given x1, hydroxyzine given x1, lidocaine solution given x3. Easy to chew diet will poor intake as it's too painful for the patient to swallow, TPN running at 63 mL/hr. Ira stated pain in the PIV in her right hand that brought her to tears and asked for it to be removed, so it was. She complained of heartburn so TUMS were given x1 but it hurt too much to swallow the med so she denied future doses. 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 Patient's  is at the bedside is attentive and supportive. Continue POC.    There was already a PA for Saul Mckeon that was approved, reference 12/20/22 telephone encounter. Has patient changed her insurance? Please respond to the pool ( [facility] [address]). Thank you!

## 2024-08-07 ENCOUNTER — HOSPITAL ENCOUNTER (OUTPATIENT)
Dept: MAMMOGRAPHY | Age: 64
Discharge: HOME | End: 2024-08-07
Payer: COMMERCIAL

## 2024-08-07 DIAGNOSIS — Z12.31 VISIT FOR SCREENING MAMMOGRAM: ICD-10-CM

## 2024-08-07 PROCEDURE — 77063 BREAST TOMOSYNTHESIS BI: CPT

## 2024-08-22 DIAGNOSIS — G43.719 INTRACTABLE CHRONIC MIGRAINE WITHOUT AURA AND WITHOUT STATUS MIGRAINOSUS: Primary | ICD-10-CM

## 2024-08-22 RX ORDER — BUTALBITAL, ACETAMINOPHEN AND CAFFEINE 50; 325; 40 MG/1; MG/1; MG/1
1 CAPSULE ORAL 2 TIMES DAILY PRN
Qty: 28 CAPSULE | Refills: 0 | Status: SHIPPED | OUTPATIENT
Start: 2024-08-22

## 2024-09-17 SDOH — ECONOMIC STABILITY: FOOD INSECURITY: WITHIN THE PAST 12 MONTHS, YOU WORRIED THAT YOUR FOOD WOULD RUN OUT BEFORE YOU GOT MONEY TO BUY MORE.: PATIENT DECLINED

## 2024-09-17 SDOH — ECONOMIC STABILITY: TRANSPORTATION INSECURITY
IN THE PAST 12 MONTHS, HAS LACK OF TRANSPORTATION KEPT YOU FROM MEETINGS, WORK, OR FROM GETTING THINGS NEEDED FOR DAILY LIVING?: NO

## 2024-09-17 SDOH — ECONOMIC STABILITY: INCOME INSECURITY: HOW HARD IS IT FOR YOU TO PAY FOR THE VERY BASICS LIKE FOOD, HOUSING, MEDICAL CARE, AND HEATING?: SOMEWHAT HARD

## 2024-09-17 SDOH — ECONOMIC STABILITY: FOOD INSECURITY: WITHIN THE PAST 12 MONTHS, THE FOOD YOU BOUGHT JUST DIDN'T LAST AND YOU DIDN'T HAVE MONEY TO GET MORE.: PATIENT DECLINED

## 2024-09-18 ASSESSMENT — PATIENT HEALTH QUESTIONNAIRE - PHQ9
SUM OF ALL RESPONSES TO PHQ QUESTIONS 1-9: 0
SUM OF ALL RESPONSES TO PHQ9 QUESTIONS 1 & 2: 0
SUM OF ALL RESPONSES TO PHQ QUESTIONS 1-9: 0
2. FEELING DOWN, DEPRESSED OR HOPELESS: NOT AT ALL
SUM OF ALL RESPONSES TO PHQ9 QUESTIONS 1 & 2: 0
SUM OF ALL RESPONSES TO PHQ QUESTIONS 1-9: 0
SUM OF ALL RESPONSES TO PHQ QUESTIONS 1-9: 0
2. FEELING DOWN, DEPRESSED OR HOPELESS: NOT AT ALL
1. LITTLE INTEREST OR PLEASURE IN DOING THINGS: NOT AT ALL
1. LITTLE INTEREST OR PLEASURE IN DOING THINGS: NOT AT ALL

## 2024-09-19 ENCOUNTER — OFFICE VISIT (OUTPATIENT)
Dept: FAMILY MEDICINE CLINIC | Age: 64
End: 2024-09-19

## 2024-09-19 VITALS
BODY MASS INDEX: 34.66 KG/M2 | HEART RATE: 94 BPM | OXYGEN SATURATION: 96 % | SYSTOLIC BLOOD PRESSURE: 142 MMHG | DIASTOLIC BLOOD PRESSURE: 86 MMHG | TEMPERATURE: 96.9 F | WEIGHT: 203 LBS | HEIGHT: 64 IN

## 2024-09-19 DIAGNOSIS — R30.0 DYSURIA: Primary | ICD-10-CM

## 2024-09-19 LAB
BILIRUBIN, POC: NORMAL
BLOOD URINE, POC: NORMAL
CLARITY, POC: CLEAR
COLOR, POC: YELLOW
GLUCOSE URINE, POC: NORMAL MG/DL
KETONES, POC: NORMAL MG/DL
LEUKOCYTE EST, POC: NORMAL
NITRITE, POC: NORMAL
PH, POC: 5.5
PROTEIN, POC: NORMAL MG/DL
SPECIFIC GRAVITY, POC: 1.03
UROBILINOGEN, POC: 0.2 MG/DL

## 2024-09-19 RX ORDER — NITROFURANTOIN 25; 75 MG/1; MG/1
100 CAPSULE ORAL 2 TIMES DAILY
Qty: 20 CAPSULE | Refills: 0 | Status: SHIPPED | OUTPATIENT
Start: 2024-09-19 | End: 2024-09-29

## 2024-09-19 RX ORDER — GABAPENTIN 100 MG/1
100 CAPSULE ORAL 3 TIMES DAILY
COMMUNITY
Start: 2024-07-17

## 2024-09-19 RX ORDER — OXYCODONE AND ACETAMINOPHEN 5; 325 MG/1; MG/1
TABLET ORAL
COMMUNITY
Start: 2024-07-03

## 2024-09-19 SDOH — ECONOMIC STABILITY: FOOD INSECURITY: WITHIN THE PAST 12 MONTHS, THE FOOD YOU BOUGHT JUST DIDN'T LAST AND YOU DIDN'T HAVE MONEY TO GET MORE.: NEVER TRUE

## 2024-09-19 SDOH — ECONOMIC STABILITY: FOOD INSECURITY: WITHIN THE PAST 12 MONTHS, YOU WORRIED THAT YOUR FOOD WOULD RUN OUT BEFORE YOU GOT MONEY TO BUY MORE.: NEVER TRUE

## 2024-09-19 SDOH — ECONOMIC STABILITY: INCOME INSECURITY: HOW HARD IS IT FOR YOU TO PAY FOR THE VERY BASICS LIKE FOOD, HOUSING, MEDICAL CARE, AND HEATING?: NOT HARD AT ALL

## 2024-11-07 ENCOUNTER — TELEPHONE (OUTPATIENT)
Dept: FAMILY MEDICINE CLINIC | Age: 64
End: 2024-11-07

## 2024-11-07 NOTE — TELEPHONE ENCOUNTER
Pt call and ask if you could please write a letter for her job stating she is okay to drive due to some her medication making her drowsy.

## 2024-11-09 NOTE — TELEPHONE ENCOUNTER
Pt receives Percocet, Fioricet and gabapentin. These include opiates, benzodiazapines and nerve modulators all of which can cause intense drowsiness and impair her ability to drive.  I can not say that it is appropriate for her to drive on these medications despite her having been on them for a number of years.

## 2024-11-18 DIAGNOSIS — G43.719 INTRACTABLE CHRONIC MIGRAINE WITHOUT AURA AND WITHOUT STATUS MIGRAINOSUS: ICD-10-CM

## 2024-11-18 RX ORDER — BUTALBITAL, ACETAMINOPHEN AND CAFFEINE 50; 325; 40 MG/1; MG/1; MG/1
1 CAPSULE ORAL 2 TIMES DAILY PRN
Qty: 28 CAPSULE | Refills: 0 | OUTPATIENT
Start: 2024-11-18

## 2024-11-18 NOTE — TELEPHONE ENCOUNTER
Requested Prescriptions     Pending Prescriptions Disp Refills    butalbital-apap-caffeine (ESGIC) -40 MG CAPS 28 capsule 0     Sig: Take 1 capsule by mouth 2 times daily as needed for Headaches or Migraine          Last Office Visit: 9/19/2024     Next Office Visit: Visit date not found

## 2024-11-22 DIAGNOSIS — G43.719 INTRACTABLE CHRONIC MIGRAINE WITHOUT AURA AND WITHOUT STATUS MIGRAINOSUS: ICD-10-CM

## 2024-11-22 NOTE — TELEPHONE ENCOUNTER
Medication:   Requested Prescriptions     Pending Prescriptions Disp Refills    butalbital-apap-caffeine (ESGIC) -40 MG CAPS 28 capsule 0     Sig: Take 1 capsule by mouth 2 times daily as needed for Headaches or Migraine     Last Filled:      Last appt: 9/19/2024   Next appt: Visit date not found    Last OARRS:       5/13/2021     3:12 PM   RX Monitoring   Periodic Controlled Substance Monitoring Possible medication side effects, risk of tolerance/dependence & alternative treatments discussed.;No signs of potential drug abuse or diversion identified.

## 2024-11-26 RX ORDER — BUTALBITAL, ACETAMINOPHEN AND CAFFEINE 50; 325; 40 MG/1; MG/1; MG/1
1 CAPSULE ORAL 2 TIMES DAILY PRN
Qty: 28 CAPSULE | Refills: 0 | Status: SHIPPED | OUTPATIENT
Start: 2024-11-26

## 2024-12-02 RX ORDER — EVOLOCUMAB 140 MG/ML
140 INJECTION, SOLUTION SUBCUTANEOUS
Qty: 2 ML | Refills: 20 | Status: SHIPPED | OUTPATIENT
Start: 2024-12-02

## 2024-12-02 NOTE — TELEPHONE ENCOUNTER
Requested Prescriptions     Pending Prescriptions Disp Refills    Evolocumab (REPATHA) SOSY syringe 2 mL 20     Sig: Inject 1 mL into the skin every 14 days          Last Office Visit: 9/19/2024     Next Office Visit: Visit date not found

## 2025-01-03 ENCOUNTER — TELEPHONE (OUTPATIENT)
Dept: ADMINISTRATIVE | Age: 65
End: 2025-01-03

## 2025-01-03 NOTE — TELEPHONE ENCOUNTER
Submitted PA for Repatha 140MG/ML syringes  Via CMM Key: [id number]  STATUS: PENDING.    Follow up done daily; if no decision with in three days we will refax.  If another three days goes by with no decision will call the insurance for status.

## 2025-01-07 NOTE — TELEPHONE ENCOUNTER
APPROVAL for Repatha 140MG/ML syringes 01/06/25-04/06/25; letter attached.    If this requires a response please respond to the pool ( P [facility]).      Thank you please advise patient.

## 2025-01-08 ENCOUNTER — PATIENT MESSAGE (OUTPATIENT)
Dept: FAMILY MEDICINE CLINIC | Age: 65
End: 2025-01-08

## 2025-01-13 DIAGNOSIS — G43.719 INTRACTABLE CHRONIC MIGRAINE WITHOUT AURA AND WITHOUT STATUS MIGRAINOSUS: Primary | ICD-10-CM

## 2025-01-13 RX ORDER — ERENUMAB-AOOE 140 MG/ML
140 INJECTION, SOLUTION SUBCUTANEOUS
Qty: 3 ML | Refills: 0 | Status: SHIPPED | OUTPATIENT
Start: 2025-01-13

## 2025-01-13 NOTE — TELEPHONE ENCOUNTER
Medication:   Requested Prescriptions     Pending Prescriptions Disp Refills    Erenumab-aooe (AIMOVIG) 140 MG/ML SOAJ 3 mL 0     Last Filled:  7/1/24    Last appt: 9/19/2024   Next appt: Visit date not found    Last OARRS:       5/13/2021     3:12 PM   RX Monitoring   Periodic Controlled Substance Monitoring Possible medication side effects, risk of tolerance/dependence & alternative treatments discussed.;No signs of potential drug abuse or diversion identified.

## 2025-01-15 ENCOUNTER — TELEPHONE (OUTPATIENT)
Dept: ADMINISTRATIVE | Age: 65
End: 2025-01-15

## 2025-01-15 NOTE — TELEPHONE ENCOUNTER
Submitted PA for Aimovig 140MG/ML auto-injectors  Via CMM Key: [id number]  STATUS: PENDING.    Follow up done daily; if no decision with in three days we will refax.  If another three days goes by with no decision will call the insurance for status.

## (undated) DEVICE — CANNULA SAMP CO2 AD GRN 7FT CO2 AND 7FT O2 TBNG UNIV CONN

## (undated) DEVICE — ERBE NESSY®PLATE 170 SPLIT; 168CM²; CABLE 3M: Brand: ERBE

## (undated) DEVICE — SYRINGE MED GEL ORISE SUBMUCOSAL LIFTING AGENT WITH NDL

## (undated) DEVICE — SUBMUCOSAL LIFTING AGENT WITH NEEDLE.: Brand: ORISE™ GEL